# Patient Record
Sex: FEMALE | Race: WHITE | NOT HISPANIC OR LATINO | Employment: OTHER | URBAN - METROPOLITAN AREA
[De-identification: names, ages, dates, MRNs, and addresses within clinical notes are randomized per-mention and may not be internally consistent; named-entity substitution may affect disease eponyms.]

---

## 2017-01-03 ENCOUNTER — APPOINTMENT (OUTPATIENT)
Dept: PHYSICAL THERAPY | Facility: CLINIC | Age: 77
End: 2017-01-03
Payer: MEDICARE

## 2017-01-03 PROCEDURE — 97112 NEUROMUSCULAR REEDUCATION: CPT

## 2017-01-03 PROCEDURE — G8978 MOBILITY CURRENT STATUS: HCPCS

## 2017-01-03 PROCEDURE — 97140 MANUAL THERAPY 1/> REGIONS: CPT

## 2017-01-03 PROCEDURE — 97110 THERAPEUTIC EXERCISES: CPT

## 2017-01-03 PROCEDURE — G8979 MOBILITY GOAL STATUS: HCPCS

## 2017-01-06 ENCOUNTER — APPOINTMENT (OUTPATIENT)
Dept: PHYSICAL THERAPY | Facility: CLINIC | Age: 77
End: 2017-01-06
Payer: MEDICARE

## 2017-01-06 PROCEDURE — 97110 THERAPEUTIC EXERCISES: CPT

## 2017-01-06 PROCEDURE — 97112 NEUROMUSCULAR REEDUCATION: CPT

## 2017-01-10 ENCOUNTER — APPOINTMENT (OUTPATIENT)
Dept: PHYSICAL THERAPY | Facility: CLINIC | Age: 77
End: 2017-01-10
Payer: MEDICARE

## 2017-01-10 PROCEDURE — 97112 NEUROMUSCULAR REEDUCATION: CPT

## 2017-01-10 PROCEDURE — 97110 THERAPEUTIC EXERCISES: CPT

## 2017-01-10 PROCEDURE — 97140 MANUAL THERAPY 1/> REGIONS: CPT

## 2017-01-13 ENCOUNTER — APPOINTMENT (OUTPATIENT)
Dept: PHYSICAL THERAPY | Facility: CLINIC | Age: 77
End: 2017-01-13
Payer: MEDICARE

## 2017-01-13 PROCEDURE — 97110 THERAPEUTIC EXERCISES: CPT

## 2017-01-13 PROCEDURE — 97014 ELECTRIC STIMULATION THERAPY: CPT

## 2017-01-13 PROCEDURE — 97140 MANUAL THERAPY 1/> REGIONS: CPT

## 2017-01-17 ENCOUNTER — APPOINTMENT (OUTPATIENT)
Dept: PHYSICAL THERAPY | Facility: CLINIC | Age: 77
End: 2017-01-17
Payer: MEDICARE

## 2017-01-17 PROCEDURE — 97140 MANUAL THERAPY 1/> REGIONS: CPT

## 2017-01-17 PROCEDURE — 97110 THERAPEUTIC EXERCISES: CPT

## 2017-01-17 PROCEDURE — 97014 ELECTRIC STIMULATION THERAPY: CPT

## 2017-01-20 ENCOUNTER — APPOINTMENT (OUTPATIENT)
Dept: PHYSICAL THERAPY | Facility: CLINIC | Age: 77
End: 2017-01-20
Payer: MEDICARE

## 2017-01-24 ENCOUNTER — APPOINTMENT (OUTPATIENT)
Dept: PHYSICAL THERAPY | Facility: CLINIC | Age: 77
End: 2017-01-24
Payer: MEDICARE

## 2017-01-24 PROCEDURE — 97140 MANUAL THERAPY 1/> REGIONS: CPT

## 2017-01-24 PROCEDURE — 97014 ELECTRIC STIMULATION THERAPY: CPT

## 2017-01-24 PROCEDURE — 97110 THERAPEUTIC EXERCISES: CPT

## 2017-01-27 ENCOUNTER — APPOINTMENT (OUTPATIENT)
Dept: PHYSICAL THERAPY | Facility: CLINIC | Age: 77
End: 2017-01-27
Payer: MEDICARE

## 2017-01-27 PROCEDURE — 97140 MANUAL THERAPY 1/> REGIONS: CPT

## 2017-01-27 PROCEDURE — 97110 THERAPEUTIC EXERCISES: CPT

## 2017-01-27 PROCEDURE — 97014 ELECTRIC STIMULATION THERAPY: CPT

## 2017-01-31 ENCOUNTER — APPOINTMENT (OUTPATIENT)
Dept: PHYSICAL THERAPY | Facility: CLINIC | Age: 77
End: 2017-01-31
Payer: MEDICARE

## 2017-01-31 PROCEDURE — 97140 MANUAL THERAPY 1/> REGIONS: CPT

## 2017-01-31 PROCEDURE — 97110 THERAPEUTIC EXERCISES: CPT

## 2017-02-02 DIAGNOSIS — M25.551 PAIN IN RIGHT HIP: ICD-10-CM

## 2017-02-02 DIAGNOSIS — M54.50 LOW BACK PAIN: ICD-10-CM

## 2017-02-03 ENCOUNTER — APPOINTMENT (OUTPATIENT)
Dept: PHYSICAL THERAPY | Facility: CLINIC | Age: 77
End: 2017-02-03
Payer: MEDICARE

## 2017-02-03 PROCEDURE — 97140 MANUAL THERAPY 1/> REGIONS: CPT

## 2017-02-03 PROCEDURE — 97110 THERAPEUTIC EXERCISES: CPT

## 2017-02-07 ENCOUNTER — APPOINTMENT (OUTPATIENT)
Dept: PHYSICAL THERAPY | Facility: CLINIC | Age: 77
End: 2017-02-07
Payer: MEDICARE

## 2017-02-07 PROCEDURE — 97110 THERAPEUTIC EXERCISES: CPT

## 2017-02-07 PROCEDURE — 97014 ELECTRIC STIMULATION THERAPY: CPT

## 2017-02-07 PROCEDURE — G8978 MOBILITY CURRENT STATUS: HCPCS

## 2017-02-07 PROCEDURE — G8979 MOBILITY GOAL STATUS: HCPCS

## 2017-02-07 PROCEDURE — 97140 MANUAL THERAPY 1/> REGIONS: CPT

## 2017-02-09 ENCOUNTER — APPOINTMENT (OUTPATIENT)
Dept: PHYSICAL THERAPY | Facility: CLINIC | Age: 77
End: 2017-02-09
Payer: MEDICARE

## 2017-02-14 ENCOUNTER — APPOINTMENT (OUTPATIENT)
Dept: PHYSICAL THERAPY | Facility: CLINIC | Age: 77
End: 2017-02-14
Payer: MEDICARE

## 2017-02-14 PROCEDURE — 97140 MANUAL THERAPY 1/> REGIONS: CPT

## 2017-02-14 PROCEDURE — 97014 ELECTRIC STIMULATION THERAPY: CPT

## 2017-02-14 PROCEDURE — 97012 MECHANICAL TRACTION THERAPY: CPT

## 2017-02-17 ENCOUNTER — APPOINTMENT (OUTPATIENT)
Dept: PHYSICAL THERAPY | Facility: CLINIC | Age: 77
End: 2017-02-17
Payer: MEDICARE

## 2017-02-21 ENCOUNTER — APPOINTMENT (OUTPATIENT)
Dept: PHYSICAL THERAPY | Facility: CLINIC | Age: 77
End: 2017-02-21
Payer: MEDICARE

## 2017-02-21 PROCEDURE — 97110 THERAPEUTIC EXERCISES: CPT

## 2017-02-21 PROCEDURE — 97140 MANUAL THERAPY 1/> REGIONS: CPT

## 2017-02-21 PROCEDURE — 97012 MECHANICAL TRACTION THERAPY: CPT

## 2017-02-24 ENCOUNTER — APPOINTMENT (OUTPATIENT)
Dept: PHYSICAL THERAPY | Facility: CLINIC | Age: 77
End: 2017-02-24
Payer: MEDICARE

## 2017-02-24 PROCEDURE — 97012 MECHANICAL TRACTION THERAPY: CPT

## 2017-02-24 PROCEDURE — 97110 THERAPEUTIC EXERCISES: CPT

## 2017-02-28 ENCOUNTER — APPOINTMENT (OUTPATIENT)
Dept: PHYSICAL THERAPY | Facility: CLINIC | Age: 77
End: 2017-02-28
Payer: MEDICARE

## 2017-03-03 ENCOUNTER — APPOINTMENT (OUTPATIENT)
Dept: PHYSICAL THERAPY | Facility: CLINIC | Age: 77
End: 2017-03-03
Payer: MEDICARE

## 2017-03-03 PROCEDURE — 97012 MECHANICAL TRACTION THERAPY: CPT

## 2017-03-03 PROCEDURE — 97110 THERAPEUTIC EXERCISES: CPT

## 2017-03-03 PROCEDURE — 97140 MANUAL THERAPY 1/> REGIONS: CPT

## 2017-03-07 ENCOUNTER — APPOINTMENT (OUTPATIENT)
Dept: PHYSICAL THERAPY | Facility: CLINIC | Age: 77
End: 2017-03-07
Payer: MEDICARE

## 2017-03-07 PROCEDURE — 97110 THERAPEUTIC EXERCISES: CPT

## 2017-03-07 PROCEDURE — 97012 MECHANICAL TRACTION THERAPY: CPT

## 2017-03-07 PROCEDURE — 97140 MANUAL THERAPY 1/> REGIONS: CPT

## 2017-03-14 ENCOUNTER — APPOINTMENT (OUTPATIENT)
Dept: PHYSICAL THERAPY | Facility: CLINIC | Age: 77
End: 2017-03-14
Payer: MEDICARE

## 2017-03-21 ENCOUNTER — APPOINTMENT (OUTPATIENT)
Dept: PHYSICAL THERAPY | Facility: CLINIC | Age: 77
End: 2017-03-21
Payer: MEDICARE

## 2017-03-21 PROCEDURE — 97012 MECHANICAL TRACTION THERAPY: CPT

## 2017-03-21 PROCEDURE — G8978 MOBILITY CURRENT STATUS: HCPCS

## 2017-03-21 PROCEDURE — G8980 MOBILITY D/C STATUS: HCPCS

## 2017-03-21 PROCEDURE — G8979 MOBILITY GOAL STATUS: HCPCS

## 2017-03-21 PROCEDURE — 97110 THERAPEUTIC EXERCISES: CPT

## 2017-03-21 PROCEDURE — 97140 MANUAL THERAPY 1/> REGIONS: CPT

## 2017-03-27 ENCOUNTER — GENERIC CONVERSION - ENCOUNTER (OUTPATIENT)
Dept: OTHER | Facility: OTHER | Age: 77
End: 2017-03-27

## 2017-04-04 ENCOUNTER — ALLSCRIPTS OFFICE VISIT (OUTPATIENT)
Dept: OTHER | Facility: OTHER | Age: 77
End: 2017-04-04

## 2017-05-30 ENCOUNTER — APPOINTMENT (OUTPATIENT)
Dept: LAB | Facility: HOSPITAL | Age: 77
End: 2017-05-30
Attending: INTERNAL MEDICINE
Payer: MEDICARE

## 2017-05-30 ENCOUNTER — TRANSCRIBE ORDERS (OUTPATIENT)
Dept: ADMINISTRATIVE | Facility: HOSPITAL | Age: 77
End: 2017-05-30

## 2017-05-30 DIAGNOSIS — I10 ESSENTIAL (PRIMARY) HYPERTENSION: ICD-10-CM

## 2017-05-30 LAB
ALBUMIN SERPL BCP-MCNC: 3.5 G/DL (ref 3.5–5)
ALP SERPL-CCNC: 64 U/L (ref 46–116)
ALT SERPL W P-5'-P-CCNC: 24 U/L (ref 12–78)
ANION GAP SERPL CALCULATED.3IONS-SCNC: 9 MMOL/L (ref 4–13)
AST SERPL W P-5'-P-CCNC: 9 U/L (ref 5–45)
BASOPHILS # BLD AUTO: 0.02 THOUSANDS/ΜL (ref 0–0.1)
BASOPHILS NFR BLD AUTO: 0 % (ref 0–1)
BILIRUB SERPL-MCNC: 0.5 MG/DL (ref 0.2–1)
BUN SERPL-MCNC: 16 MG/DL (ref 5–25)
CALCIUM SERPL-MCNC: 8.6 MG/DL (ref 8.3–10.1)
CHLORIDE SERPL-SCNC: 108 MMOL/L (ref 100–108)
CHOLEST SERPL-MCNC: 166 MG/DL (ref 50–200)
CO2 SERPL-SCNC: 28 MMOL/L (ref 21–32)
CREAT SERPL-MCNC: 0.79 MG/DL (ref 0.6–1.3)
EOSINOPHIL # BLD AUTO: 0.19 THOUSAND/ΜL (ref 0–0.61)
EOSINOPHIL NFR BLD AUTO: 3 % (ref 0–6)
ERYTHROCYTE [DISTWIDTH] IN BLOOD BY AUTOMATED COUNT: 14 % (ref 11.6–15.1)
GFR SERPL CREATININE-BSD FRML MDRD: >60 ML/MIN/1.73SQ M
GLUCOSE P FAST SERPL-MCNC: 94 MG/DL (ref 65–99)
HCT VFR BLD AUTO: 40.2 % (ref 34.8–46.1)
HDLC SERPL-MCNC: 52 MG/DL (ref 40–60)
HGB BLD-MCNC: 13 G/DL (ref 11.5–15.4)
LDLC SERPL CALC-MCNC: 71 MG/DL (ref 0–100)
LYMPHOCYTES # BLD AUTO: 1.6 THOUSANDS/ΜL (ref 0.6–4.47)
LYMPHOCYTES NFR BLD AUTO: 27 % (ref 14–44)
MCH RBC QN AUTO: 29.1 PG (ref 26.8–34.3)
MCHC RBC AUTO-ENTMCNC: 32.3 G/DL (ref 31.4–37.4)
MCV RBC AUTO: 90 FL (ref 82–98)
MONOCYTES # BLD AUTO: 0.61 THOUSAND/ΜL (ref 0.17–1.22)
MONOCYTES NFR BLD AUTO: 10 % (ref 4–12)
NEUTROPHILS # BLD AUTO: 3.42 THOUSANDS/ΜL (ref 1.85–7.62)
NEUTS SEG NFR BLD AUTO: 60 % (ref 43–75)
PLATELET # BLD AUTO: 180 THOUSANDS/UL (ref 149–390)
PMV BLD AUTO: 12.4 FL (ref 8.9–12.7)
POTASSIUM SERPL-SCNC: 3.9 MMOL/L (ref 3.5–5.3)
PROT SERPL-MCNC: 6.7 G/DL (ref 6.4–8.2)
RBC # BLD AUTO: 4.46 MILLION/UL (ref 3.81–5.12)
SODIUM SERPL-SCNC: 145 MMOL/L (ref 136–145)
TRIGL SERPL-MCNC: 216 MG/DL
TSH SERPL DL<=0.05 MIU/L-ACNC: 1.34 UIU/ML (ref 0.36–3.74)
WBC # BLD AUTO: 5.84 THOUSAND/UL (ref 4.31–10.16)

## 2017-05-30 PROCEDURE — 85025 COMPLETE CBC W/AUTO DIFF WBC: CPT

## 2017-05-30 PROCEDURE — 80061 LIPID PANEL: CPT

## 2017-05-30 PROCEDURE — 80053 COMPREHEN METABOLIC PANEL: CPT

## 2017-05-30 PROCEDURE — 84443 ASSAY THYROID STIM HORMONE: CPT

## 2017-05-30 PROCEDURE — 36415 COLL VENOUS BLD VENIPUNCTURE: CPT

## 2017-06-15 ENCOUNTER — ALLSCRIPTS OFFICE VISIT (OUTPATIENT)
Dept: OTHER | Facility: OTHER | Age: 77
End: 2017-06-15

## 2017-06-15 DIAGNOSIS — Z00.00 ENCOUNTER FOR GENERAL ADULT MEDICAL EXAMINATION WITHOUT ABNORMAL FINDINGS: ICD-10-CM

## 2017-06-16 ENCOUNTER — GENERIC CONVERSION - ENCOUNTER (OUTPATIENT)
Dept: OTHER | Facility: OTHER | Age: 77
End: 2017-06-16

## 2017-06-30 ENCOUNTER — HOSPITAL ENCOUNTER (OUTPATIENT)
Dept: BONE DENSITY | Facility: IMAGING CENTER | Age: 77
Discharge: HOME | End: 2017-06-30
Payer: MEDICARE

## 2017-06-30 ENCOUNTER — GENERIC CONVERSION - ENCOUNTER (OUTPATIENT)
Dept: OTHER | Facility: OTHER | Age: 77
End: 2017-06-30

## 2017-06-30 DIAGNOSIS — Z00.00 ENCOUNTER FOR GENERAL ADULT MEDICAL EXAMINATION WITHOUT ABNORMAL FINDINGS: ICD-10-CM

## 2017-06-30 PROCEDURE — 77080 DXA BONE DENSITY AXIAL: CPT

## 2017-08-16 ENCOUNTER — ALLSCRIPTS OFFICE VISIT (OUTPATIENT)
Dept: OTHER | Facility: OTHER | Age: 77
End: 2017-08-16

## 2017-11-20 ENCOUNTER — GENERIC CONVERSION - ENCOUNTER (OUTPATIENT)
Dept: OTHER | Facility: OTHER | Age: 77
End: 2017-11-20

## 2017-12-13 ENCOUNTER — GENERIC CONVERSION - ENCOUNTER (OUTPATIENT)
Dept: OTHER | Facility: OTHER | Age: 77
End: 2017-12-13

## 2017-12-13 ENCOUNTER — ALLSCRIPTS OFFICE VISIT (OUTPATIENT)
Dept: OTHER | Facility: OTHER | Age: 77
End: 2017-12-13

## 2018-01-11 NOTE — RESULT NOTES
Verified Results  * XR HIP 2+ VIEW RIGHT [id number] 10:10AM Karyle Query   TW Order Number: [id number]     Test Name Result Flag Reference   * XR HIP/PELV 2-3 VWS RIGHT (Report)     RIGHT HIP     INDICATION: Chronic joint pain for 6 months  COMPARISON: March 14, 2013     VIEWS: AP pelvis and 2 coned down views of the hip; 3 images     FINDINGS:     There is no acute fracture or dislocation  Significant joint space narrowing again noted within the right hip joint  This has shown interval progression when compared to prior study  Subchondral sclerosis and hypertrophic changes seen within the acetabulum  No lytic or blastic lesions are seen  Soft tissues are unremarkable         IMPRESSION:     Advanced osteoarthritis right hip       Workstation performed: [id number]     Signed by:   Suzette Whitaker MD   5/25/16

## 2018-01-12 VITALS
WEIGHT: 197 LBS | HEART RATE: 76 BPM | HEIGHT: 68 IN | DIASTOLIC BLOOD PRESSURE: 64 MMHG | SYSTOLIC BLOOD PRESSURE: 132 MMHG | TEMPERATURE: 99.3 F | BODY MASS INDEX: 29.86 KG/M2

## 2018-01-13 VITALS
HEART RATE: 80 BPM | SYSTOLIC BLOOD PRESSURE: 152 MMHG | RESPIRATION RATE: 16 BRPM | DIASTOLIC BLOOD PRESSURE: 72 MMHG | BODY MASS INDEX: 32.89 KG/M2 | WEIGHT: 217 LBS | HEIGHT: 68 IN

## 2018-01-13 NOTE — RESULT NOTES
Verified Results  * DXA BONE DENSITY SPINE HIP AND PELVIS [id number] 09:28AM Kristin Harvey Order Number: [id number]    - Patient Instructions: To schedule this appointment, please contact Central Scheduling at [phone]  Test Name Result Flag Reference   DXA BONE DENSITY SPINE HIP AND PELVIS (Report)     CENTRAL DXA SCAN     CLINICAL HISTORY:  78year old post-menopausal  female risk factors include family history of osteoporosis  Right hip replacement  TECHNIQUE: Bone densitometry was performed using a [employer] [place] C bone densitometer  Regions of interest appear properly placed  There are no obvious fractures or other confounding variables which could limit the study  COMPARISON: None available at this institution for comparison  If and when the outside prior examinations arrive, comparison will be made and an addendum will be dictated at that time  RESULTS:    LUMBAR SPINE: L1-L4:   BMD 1 281 gm/cm2   T-score 2 1   Z-score 4 5     LEFT TOTAL HIP:   BMD 0 952 gm/cm2   T-score 0 1   Z-score 1 9     LEFT FEMORAL NECK:   BMD 0 801 gm/cm2   T-score -0 4   Z-score 1 7             IMPRESSION:   1  Based on the [facility] classification, this study is normal and the patient is considered at low risk for fracture  2  A daily intake of calcium of at least 1200 mg and vitamin D, 800-1000 IU, as well as weight bearing and muscle strengthening exercise, fall prevention and avoidance of tobacco and excessive alcohol intake as basic preventive measures are recommended  3  Repeat DXA scan on the same equipment in 18-24 months as clinically indicated  The 10 year risk of hip fracture is 4%, with the 10 year risk of major osteoporotic fracture being 12%, as calculated by the [facility] fracture risk assessment tool (FRAX)  The current NOF guidelines recommend treating patients with FRAX 10 year risk score of   >3% for hip fracture and >20% for major osteoporotic fracture        WHO CLASSIFICATION:   Normal (a T-score of -1 0 or higher)   Low bone mineral density (a T-score of less than -1 0 but higher than -2 5)   Osteoporosis (a T-score of -2 5 or less)   Severe osteoporosis (a T-score of -2 5 or less with a fragility fracture)             Workstation performed: [id number]     Signed by:   Judy Maki DO   6/30/17
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
## 2018-01-15 VITALS
SYSTOLIC BLOOD PRESSURE: 136 MMHG | HEART RATE: 80 BPM | RESPIRATION RATE: 16 BRPM | BODY MASS INDEX: 32.43 KG/M2 | WEIGHT: 214 LBS | TEMPERATURE: 99.1 F | DIASTOLIC BLOOD PRESSURE: 70 MMHG | HEIGHT: 68 IN

## 2018-01-24 VITALS
BODY MASS INDEX: 33.49 KG/M2 | OXYGEN SATURATION: 96 % | HEIGHT: 68 IN | TEMPERATURE: 96.9 F | SYSTOLIC BLOOD PRESSURE: 140 MMHG | DIASTOLIC BLOOD PRESSURE: 80 MMHG | WEIGHT: 221 LBS | HEART RATE: 75 BPM

## 2018-04-03 DIAGNOSIS — I10 ESSENTIAL HYPERTENSION: Primary | ICD-10-CM

## 2018-04-03 RX ORDER — VALSARTAN 160 MG/1
TABLET ORAL
Qty: 90 TABLET | Refills: 3 | Status: SHIPPED | OUTPATIENT
Start: 2018-04-03 | End: 2019-04-15 | Stop reason: SDUPTHER

## 2018-04-04 DIAGNOSIS — E78.00 HYPERCHOLESTEREMIA: Primary | ICD-10-CM

## 2018-04-04 RX ORDER — SIMVASTATIN 20 MG
TABLET ORAL
Qty: 90 TABLET | Refills: 0 | Status: SHIPPED | OUTPATIENT
Start: 2018-04-04 | End: 2019-02-25 | Stop reason: SDUPTHER

## 2018-04-18 ENCOUNTER — APPOINTMENT (OUTPATIENT)
Dept: LAB | Facility: HOSPITAL | Age: 78
End: 2018-04-18
Attending: INTERNAL MEDICINE
Payer: MEDICARE

## 2018-04-18 ENCOUNTER — OFFICE VISIT (OUTPATIENT)
Dept: FAMILY MEDICINE CLINIC | Facility: HOSPITAL | Age: 78
End: 2018-04-18
Payer: MEDICARE

## 2018-04-18 VITALS
OXYGEN SATURATION: 94 % | HEART RATE: 84 BPM | SYSTOLIC BLOOD PRESSURE: 140 MMHG | TEMPERATURE: 98.6 F | BODY MASS INDEX: 33.12 KG/M2 | DIASTOLIC BLOOD PRESSURE: 72 MMHG | WEIGHT: 218.5 LBS | HEIGHT: 68 IN

## 2018-04-18 DIAGNOSIS — I10 BENIGN ESSENTIAL HYPERTENSION: Primary | ICD-10-CM

## 2018-04-18 DIAGNOSIS — Z23 NEED FOR PNEUMOCOCCAL VACCINATION: ICD-10-CM

## 2018-04-18 DIAGNOSIS — I10 BENIGN ESSENTIAL HYPERTENSION: ICD-10-CM

## 2018-04-18 DIAGNOSIS — R50.9 FEVER, UNSPECIFIED FEVER CAUSE: ICD-10-CM

## 2018-04-18 DIAGNOSIS — E78.00 PURE HYPERCHOLESTEROLEMIA: ICD-10-CM

## 2018-04-18 PROBLEM — E78.5 HYPERLIPIDEMIA: Status: ACTIVE | Noted: 2018-04-18

## 2018-04-18 PROBLEM — H35.30 MACULAR DEGENERATION: Status: ACTIVE | Noted: 2018-04-18

## 2018-04-18 LAB
ALBUMIN SERPL BCP-MCNC: 3.6 G/DL (ref 3.5–5)
ALP SERPL-CCNC: 53 U/L (ref 46–116)
ALT SERPL W P-5'-P-CCNC: 38 U/L (ref 12–78)
ANION GAP SERPL CALCULATED.3IONS-SCNC: 5 MMOL/L (ref 4–13)
AST SERPL W P-5'-P-CCNC: 22 U/L (ref 5–45)
BASOPHILS # BLD AUTO: 0.01 THOUSANDS/ΜL (ref 0–0.1)
BASOPHILS NFR BLD AUTO: 0 % (ref 0–1)
BILIRUB SERPL-MCNC: 0.5 MG/DL (ref 0.2–1)
BUN SERPL-MCNC: 22 MG/DL (ref 5–25)
CALCIUM SERPL-MCNC: 8.6 MG/DL (ref 8.3–10.1)
CHLORIDE SERPL-SCNC: 107 MMOL/L (ref 100–108)
CO2 SERPL-SCNC: 33 MMOL/L (ref 21–32)
CREAT SERPL-MCNC: 0.82 MG/DL (ref 0.6–1.3)
EOSINOPHIL # BLD AUTO: 0.08 THOUSAND/ΜL (ref 0–0.61)
EOSINOPHIL NFR BLD AUTO: 1 % (ref 0–6)
ERYTHROCYTE [DISTWIDTH] IN BLOOD BY AUTOMATED COUNT: 13.4 % (ref 11.6–15.1)
FSH SERPL-ACNC: 44.5 MIU/ML
GFR SERPL CREATININE-BSD FRML MDRD: 70 ML/MIN/1.73SQ M
GLUCOSE SERPL-MCNC: 102 MG/DL (ref 65–140)
HCT VFR BLD AUTO: 40.7 % (ref 34.8–46.1)
HGB BLD-MCNC: 13.3 G/DL (ref 11.5–15.4)
LH SERPL-ACNC: 18.3 MIU/ML
LYMPHOCYTES # BLD AUTO: 1.52 THOUSANDS/ΜL (ref 0.6–4.47)
LYMPHOCYTES NFR BLD AUTO: 24 % (ref 14–44)
MCH RBC QN AUTO: 29.8 PG (ref 26.8–34.3)
MCHC RBC AUTO-ENTMCNC: 32.7 G/DL (ref 31.4–37.4)
MCV RBC AUTO: 91 FL (ref 82–98)
MONOCYTES # BLD AUTO: 0.43 THOUSAND/ΜL (ref 0.17–1.22)
MONOCYTES NFR BLD AUTO: 7 % (ref 4–12)
NEUTROPHILS # BLD AUTO: 4.31 THOUSANDS/ΜL (ref 1.85–7.62)
NEUTS SEG NFR BLD AUTO: 68 % (ref 43–75)
PLATELET # BLD AUTO: 174 THOUSANDS/UL (ref 149–390)
PMV BLD AUTO: 12.3 FL (ref 8.9–12.7)
POTASSIUM SERPL-SCNC: 4 MMOL/L (ref 3.5–5.3)
PROT SERPL-MCNC: 6.8 G/DL (ref 6.4–8.2)
RBC # BLD AUTO: 4.46 MILLION/UL (ref 3.81–5.12)
SODIUM SERPL-SCNC: 145 MMOL/L (ref 136–145)
TSH SERPL DL<=0.05 MIU/L-ACNC: 0.85 UIU/ML (ref 0.36–3.74)
WBC # BLD AUTO: 6.35 THOUSAND/UL (ref 4.31–10.16)

## 2018-04-18 PROCEDURE — 85025 COMPLETE CBC W/AUTO DIFF WBC: CPT

## 2018-04-18 PROCEDURE — 36415 COLL VENOUS BLD VENIPUNCTURE: CPT

## 2018-04-18 PROCEDURE — G0009 ADMIN PNEUMOCOCCAL VACCINE: HCPCS

## 2018-04-18 PROCEDURE — 80053 COMPREHEN METABOLIC PANEL: CPT

## 2018-04-18 PROCEDURE — 90670 PCV13 VACCINE IM: CPT

## 2018-04-18 PROCEDURE — 99214 OFFICE O/P EST MOD 30 MIN: CPT | Performed by: INTERNAL MEDICINE

## 2018-04-18 PROCEDURE — 83001 ASSAY OF GONADOTROPIN (FSH): CPT

## 2018-04-18 PROCEDURE — 84443 ASSAY THYROID STIM HORMONE: CPT

## 2018-04-18 PROCEDURE — 83002 ASSAY OF GONADOTROPIN (LH): CPT

## 2018-04-18 RX ORDER — VIT A/VIT C/VIT E/ZINC/COPPER 4296-226
2 CAPSULE ORAL DAILY
COMMUNITY
Start: 2015-05-12

## 2018-04-18 RX ORDER — FLUTICASONE PROPIONATE 50 MCG
SPRAY, SUSPENSION (ML) NASAL
COMMUNITY
Start: 2017-04-04 | End: 2018-10-22

## 2018-04-18 NOTE — PATIENT INSTRUCTIONS
You were seen today for an acute episode of illness and feeling unwell  Be sure to rest, drink fluids and take any medications ordered  If the doctor ordered testing, please get this done promptly and be sure to follow up with the doctor's office  You will get a call if any of the tests show abnormal results

## 2018-04-18 NOTE — PROGRESS NOTES
Assessment/Plan:    Benign essential hypertension  On meds currently    Hyperlipidemia  On statin    Fever  C/o off and on fever    Patient Active Problem List   Diagnosis    Benign essential hypertension    Hyperlipidemia    Macular degeneration    Fever     No orders of the defined types were placed in this encounter  Diagnoses and all orders for this visit:    Benign essential hypertension  -     Comprehensive metabolic panel; Future    Pure hypercholesterolemia    Fever, unspecified fever cause  -     CBC and differential; Future  -     TSH, 3rd generation; Future  -     FSH and LH; Future    Other orders  -     fluticasone (FLONASE) 50 mcg/act nasal spray; into each nostril  -     Discontinue: MULTIPLE VITAMINS-MINERALS ER PO; Take 2 tablets by mouth  -     Multiple Vitamins-Minerals (PRESERVISION AREDS) CAPS; Take 2 capsules by mouth daily          Subjective:      Patient ID: Nj Mckay is a 70 y o  female  Acute visit with c/o fevers        The following portions of the patient's history were reviewed and updated as appropriate: allergies, current medications, past family history, past medical history, past social history, past surgical history and problem list     Review of Systems   Constitutional: Negative for fatigue and fever  HENT: Negative for hearing loss  Eyes: Negative for visual disturbance  Respiratory: Negative for cough, chest tightness, shortness of breath and wheezing  Cardiovascular: Negative for chest pain, palpitations and leg swelling  Gastrointestinal: Negative for abdominal pain, diarrhea and nausea  Genitourinary: Negative for dysuria and hematuria  Musculoskeletal: Negative for arthralgias  Neurological: Negative for dizziness, numbness and headaches  Psychiatric/Behavioral: Negative for confusion and dysphoric mood  All other systems reviewed and are negative          Objective:      Current Outpatient Prescriptions:     acetaminophen (TYLENOL) 325 mg tablet, Take 650 mg by mouth every 6 (six) hours as needed for mild pain, Disp: , Rfl:     amLODIPine (NORVASC) 5 mg tablet, Take 5 mg by mouth daily, Disp: , Rfl:     aspirin 325 mg tablet, Take 325 mg by mouth daily, Disp: , Rfl:     calcium carbonate-vitamin D (OSCAL-D) 500 mg-200 units per tablet, Take 2 tablets by mouth daily with breakfast, Disp: , Rfl:     celecoxib (CeleBREX) 200 mg capsule, Take 200 mg by mouth 2 (two) times a day, Disp: , Rfl:     docusate sodium (COLACE) 100 mg capsule, Take 100 mg by mouth 2 (two) times a day, Disp: , Rfl:     famotidine (PEPCID) 20 mg tablet, Take 20 mg by mouth 2 (two) times a day, Disp: , Rfl:     fexofenadine (ALLEGRA) 180 MG tablet, Take 180 mg by mouth daily, Disp: , Rfl:     gabapentin (NEURONTIN) 100 mg capsule, Take 100 mg by mouth 3 (three) times a day, Disp: , Rfl:     simvastatin (ZOCOR) 20 mg tablet, TAKE 1 TABLET BY MOUTH EVERY DAY, Disp: 90 tablet, Rfl: 0    sodium chloride (DEEJAY 128) 2 % hypertonic ophthalmic solution, 1 drop 5 (five) times a day, Disp: , Rfl:     valsartan (DIOVAN) 160 mg tablet, TAKE 1 TABLET EVERY DAY, Disp: 90 tablet, Rfl: 3     Physical Exam   Constitutional: She is oriented to person, place, and time  She appears well-developed and well-nourished  Eyes: Pupils are equal, round, and reactive to light  Neck: Normal range of motion  Neck supple  No thyromegaly present  Cardiovascular: Normal rate, regular rhythm, normal heart sounds and intact distal pulses  No murmur heard  Pulmonary/Chest: Effort normal and breath sounds normal  She has no wheezes  She has no rales  Abdominal: Soft  Bowel sounds are normal  There is no tenderness  Musculoskeletal: Normal range of motion  She exhibits no edema, tenderness or deformity  Lymphadenopathy:     She has no cervical adenopathy  Neurological: She is alert and oriented to person, place, and time  She has normal reflexes     Skin: Skin is warm and dry    Psychiatric: She has a normal mood and affect  Vitals reviewed

## 2018-04-18 NOTE — ASSESSMENT & PLAN NOTE
C/o off and on fever    Describes hot flushes intermittently,  Can last 30 min to few hours,  Suspect more hot flashes that fever  No documentation of fever

## 2018-04-29 DIAGNOSIS — I10 ESSENTIAL HYPERTENSION: Primary | ICD-10-CM

## 2018-04-30 RX ORDER — AMLODIPINE BESYLATE 5 MG/1
TABLET ORAL
Qty: 90 TABLET | Refills: 2 | Status: SHIPPED | OUTPATIENT
Start: 2018-04-30 | End: 2019-02-06 | Stop reason: SDUPTHER

## 2018-05-14 DIAGNOSIS — E78.5 HYPERLIPIDEMIA: ICD-10-CM

## 2018-05-14 DIAGNOSIS — Z00.00 ENCOUNTER FOR GENERAL ADULT MEDICAL EXAMINATION WITHOUT ABNORMAL FINDINGS: ICD-10-CM

## 2018-10-22 ENCOUNTER — OFFICE VISIT (OUTPATIENT)
Dept: FAMILY MEDICINE CLINIC | Facility: HOSPITAL | Age: 78
End: 2018-10-22
Payer: MEDICARE

## 2018-10-22 VITALS
HEIGHT: 68 IN | WEIGHT: 231 LBS | BODY MASS INDEX: 35.01 KG/M2 | SYSTOLIC BLOOD PRESSURE: 146 MMHG | HEART RATE: 86 BPM | DIASTOLIC BLOOD PRESSURE: 76 MMHG

## 2018-10-22 DIAGNOSIS — E78.49 OTHER HYPERLIPIDEMIA: ICD-10-CM

## 2018-10-22 DIAGNOSIS — E78.00 PURE HYPERCHOLESTEROLEMIA: Primary | ICD-10-CM

## 2018-10-22 DIAGNOSIS — I10 BENIGN ESSENTIAL HYPERTENSION: ICD-10-CM

## 2018-10-22 DIAGNOSIS — R53.83 FATIGUE, UNSPECIFIED TYPE: ICD-10-CM

## 2018-10-22 PROBLEM — R50.9 FEVER: Status: RESOLVED | Noted: 2018-04-18 | Resolved: 2018-10-22

## 2018-10-22 PROCEDURE — 99214 OFFICE O/P EST MOD 30 MIN: CPT | Performed by: INTERNAL MEDICINE

## 2018-10-22 NOTE — PATIENT INSTRUCTIONS
Please check to see if you are taking amlodipine 5 mg daily for blood pressure? Call us with answer  Labs slip given today for labs due in spring  Fast 12 hours and get these done one week before next visit  You were seen for a follow up of chronic medical problems today  Be sure to make any changes to your medication as discussed by your doctor  If blood tests or other testing was ordered, be sure to obtain this at the time requested by the doctor  Our office will call you with the results of your tests once they arrive in our office

## 2018-10-22 NOTE — PROGRESS NOTES
Subjective:   Chief Complaint   Patient presents with    Follow-up     pt declines flu vaccine  Patient ID: Alfredo Mar is a 70 y o  female  Here for follow up  Doing well  Confusion about meds and did discuss this  The following portions of the patient's history were reviewed and updated as appropriate: allergies, current medications, past family history, past medical history, past social history, past surgical history and problem list     Review of Systems   Constitutional: Negative for fatigue and fever  HENT: Negative for hearing loss  Eyes: Negative for visual disturbance  Respiratory: Negative for cough, chest tightness, shortness of breath and wheezing  Cardiovascular: Negative for chest pain, palpitations and leg swelling  Gastrointestinal: Negative for abdominal pain, diarrhea and nausea  Genitourinary: Negative for dysuria and hematuria  Musculoskeletal: Negative for arthralgias  Neurological: Negative for dizziness, numbness and headaches  Psychiatric/Behavioral: Negative for confusion and dysphoric mood  All other systems reviewed and are negative  Current Outpatient Prescriptions on File Prior to Visit   Medication Sig Dispense Refill    amLODIPine (NORVASC) 5 mg tablet TAKE 1 TABLET DAILY   90 tablet 2    calcium carbonate-vitamin D (OSCAL-D) 500 mg-200 units per tablet Take 2 tablets by mouth daily with breakfast      Multiple Vitamins-Minerals (PRESERVISION AREDS) CAPS Take 2 capsules by mouth daily      simvastatin (ZOCOR) 20 mg tablet TAKE 1 TABLET BY MOUTH EVERY DAY 90 tablet 0    sodium chloride (DEEJAY 128) 2 % hypertonic ophthalmic solution 1 drop 5 (five) times a day      valsartan (DIOVAN) 160 mg tablet TAKE 1 TABLET EVERY DAY 90 tablet 3    [DISCONTINUED] aspirin 325 mg tablet Take 325 mg by mouth daily      [DISCONTINUED] celecoxib (CeleBREX) 200 mg capsule Take 200 mg by mouth 2 (two) times a day      [DISCONTINUED] fluticasone (FLONASE) 50 mcg/act nasal spray into each nostril      [DISCONTINUED] acetaminophen (TYLENOL) 325 mg tablet Take 650 mg by mouth every 6 (six) hours as needed for mild pain      [DISCONTINUED] docusate sodium (COLACE) 100 mg capsule Take 100 mg by mouth 2 (two) times a day      [DISCONTINUED] famotidine (PEPCID) 20 mg tablet Take 20 mg by mouth 2 (two) times a day      [DISCONTINUED] fexofenadine (ALLEGRA) 180 MG tablet Take 180 mg by mouth daily      [DISCONTINUED] gabapentin (NEURONTIN) 100 mg capsule Take 100 mg by mouth 3 (three) times a day       No current facility-administered medications on file prior to visit  Objective:  Vitals:    10/22/18 0850   BP: 146/76   Pulse: 86   Weight: 105 kg (231 lb)   Height: 5' 7 5" (1 715 m)      Physical Exam   Constitutional: She is oriented to person, place, and time  She appears well-developed and well-nourished  Eyes: Pupils are equal, round, and reactive to light  Neck: Normal range of motion  Neck supple  No thyromegaly present  Cardiovascular: Normal rate, regular rhythm, normal heart sounds and intact distal pulses  No murmur heard  Pulmonary/Chest: Effort normal and breath sounds normal  She has no wheezes  She has no rales  Abdominal: Soft  Bowel sounds are normal  There is no tenderness  Musculoskeletal: Normal range of motion  She exhibits no edema, tenderness or deformity  Lymphadenopathy:     She has no cervical adenopathy  Neurological: She is alert and oriented to person, place, and time  She has normal reflexes  Skin: Skin is warm and dry  Psychiatric: She has a normal mood and affect  Nursing note and vitals reviewed  Assessment/Plan:    No problem-specific Assessment & Plan notes found for this encounter         Diagnoses and all orders for this visit:    Pure hypercholesterolemia    Benign essential hypertension    Other hyperlipidemia    Other orders  -     Cancel: influenza vaccine, [id number], high-dose, PF 0 5 mL, for patients 65 yr+ (FLUZONE HIGH-DOSE)

## 2019-02-06 ENCOUNTER — TELEPHONE (OUTPATIENT)
Dept: FAMILY MEDICINE CLINIC | Facility: HOSPITAL | Age: 79
End: 2019-02-06

## 2019-02-06 DIAGNOSIS — I10 ESSENTIAL HYPERTENSION: ICD-10-CM

## 2019-02-06 RX ORDER — AMLODIPINE BESYLATE 5 MG/1
5 TABLET ORAL DAILY
Qty: 90 TABLET | Refills: 2 | Status: SHIPPED | OUTPATIENT
Start: 2019-02-06 | End: 2020-12-29 | Stop reason: SDUPTHER

## 2019-02-12 ENCOUNTER — OFFICE VISIT (OUTPATIENT)
Dept: FAMILY MEDICINE CLINIC | Facility: HOSPITAL | Age: 79
End: 2019-02-12
Payer: MEDICARE

## 2019-02-12 VITALS
WEIGHT: 228.5 LBS | OXYGEN SATURATION: 97 % | TEMPERATURE: 98.1 F | DIASTOLIC BLOOD PRESSURE: 74 MMHG | HEART RATE: 84 BPM | BODY MASS INDEX: 34.63 KG/M2 | HEIGHT: 68 IN | SYSTOLIC BLOOD PRESSURE: 180 MMHG

## 2019-02-12 DIAGNOSIS — R03.0 ELEVATED BLOOD PRESSURE READING: Primary | ICD-10-CM

## 2019-02-12 DIAGNOSIS — R41.3 MEMORY LOSS: ICD-10-CM

## 2019-02-12 PROCEDURE — 99214 OFFICE O/P EST MOD 30 MIN: CPT | Performed by: INTERNAL MEDICINE

## 2019-02-12 NOTE — PATIENT INSTRUCTIONS
Please obtain labs ordered as soon as possible         Consider adding medication for stress/depression

## 2019-02-12 NOTE — PROGRESS NOTES
Subjective:   Chief Complaint   Patient presents with    Memory Loss     x worse 2 month         Patient ID: Frandy Moreno is a 70 y o  female  Here with    is concerned about patient's forgetfulness  Patient appears upset and agitated as  is talking  Patient states no family hx of dementia  Patient states she is not as concerned about this as    has had recent concerns with worsening  The following portions of the patient's history were reviewed and updated as appropriate: allergies, current medications, past family history, past medical history, past social history, past surgical history and problem list     Review of Systems   Constitutional: Negative for fatigue and fever  HENT: Negative for hearing loss  Eyes: Negative for visual disturbance  Respiratory: Negative for cough, chest tightness, shortness of breath and wheezing  Cardiovascular: Negative for chest pain, palpitations and leg swelling  Gastrointestinal: Negative for abdominal pain, diarrhea and nausea  Genitourinary: Negative for dysuria and hematuria  Musculoskeletal: Negative for arthralgias  Neurological: Negative for dizziness, numbness and headaches  Psychiatric/Behavioral: Positive for confusion  Negative for dysphoric mood  All other systems reviewed and are negative          Current Outpatient Medications on File Prior to Visit   Medication Sig Dispense Refill    amLODIPine (NORVASC) 5 mg tablet Take 1 tablet (5 mg total) by mouth daily 90 tablet 2    calcium carbonate-vitamin D (OSCAL-D) 500 mg-200 units per tablet Take 2 tablets by mouth daily with breakfast      Multiple Vitamins-Minerals (PRESERVISION AREDS) CAPS Take 2 capsules by mouth daily      simvastatin (ZOCOR) 20 mg tablet TAKE 1 TABLET BY MOUTH EVERY DAY 90 tablet 0    sodium chloride (DEEJAY 128) 2 % hypertonic ophthalmic solution 1 drop 5 (five) times a day      valsartan (DIOVAN) 160 mg tablet TAKE 1 TABLET EVERY DAY 90 tablet 3     No current facility-administered medications on file prior to visit  Objective:  Vitals:    02/12/19 1108   BP: (!) 180/74   Pulse: 84   Temp: 98 1 °F (36 7 °C)   SpO2: 97%   Weight: 104 kg (228 lb 8 oz)   Height: 5' 7 5" (1 715 m)      Physical Exam   Constitutional: She is oriented to person, place, and time  She appears well-developed and well-nourished  Eyes: Pupils are equal, round, and reactive to light  Neck: Normal range of motion  Neck supple  No thyromegaly present  Cardiovascular: Normal rate, regular rhythm, normal heart sounds and intact distal pulses  No murmur heard  Pulmonary/Chest: Effort normal and breath sounds normal  She has no wheezes  She has no rales  Abdominal: Soft  Bowel sounds are normal  There is no tenderness  Musculoskeletal: Normal range of motion  She exhibits no edema, tenderness or deformity  Lymphadenopathy:     She has no cervical adenopathy  Neurological: She is alert and oriented to person, place, and time  She has normal reflexes  Skin: Skin is warm and dry  Psychiatric: She has a normal mood and affect  Nursing note and vitals reviewed  Assessment/Plan:    No problem-specific Assessment & Plan notes found for this encounter  Diagnoses and all orders for this visit:    Elevated blood pressure reading    Memory loss  -     Vitamin D 25 hydroxy; Future  -     Vitamin B1, whole blood; Future    Body mass index (BMI) of 35 0-35 9 in adult   -     Vitamin D 25 hydroxy;  Future

## 2019-02-18 ENCOUNTER — APPOINTMENT (OUTPATIENT)
Dept: LAB | Facility: HOSPITAL | Age: 79
End: 2019-02-18
Attending: INTERNAL MEDICINE
Payer: MEDICARE

## 2019-02-18 DIAGNOSIS — R41.3 MEMORY LOSS: ICD-10-CM

## 2019-02-18 DIAGNOSIS — E78.49 OTHER HYPERLIPIDEMIA: ICD-10-CM

## 2019-02-18 DIAGNOSIS — I10 BENIGN ESSENTIAL HYPERTENSION: ICD-10-CM

## 2019-02-18 DIAGNOSIS — R53.83 FATIGUE, UNSPECIFIED TYPE: ICD-10-CM

## 2019-02-18 LAB
25(OH)D3 SERPL-MCNC: 40.6 NG/ML (ref 30–100)
ALBUMIN SERPL BCP-MCNC: 3.7 G/DL (ref 3.5–5)
ALP SERPL-CCNC: 64 U/L (ref 46–116)
ALT SERPL W P-5'-P-CCNC: 29 U/L (ref 12–78)
ANION GAP SERPL CALCULATED.3IONS-SCNC: 9 MMOL/L (ref 4–13)
AST SERPL W P-5'-P-CCNC: 21 U/L (ref 5–45)
BASOPHILS # BLD AUTO: 0.02 THOUSANDS/ΜL (ref 0–0.1)
BASOPHILS NFR BLD AUTO: 0 % (ref 0–1)
BILIRUB SERPL-MCNC: 0.6 MG/DL (ref 0.2–1)
BUN SERPL-MCNC: 16 MG/DL (ref 5–25)
CALCIUM SERPL-MCNC: 8.5 MG/DL (ref 8.3–10.1)
CHLORIDE SERPL-SCNC: 106 MMOL/L (ref 100–108)
CHOLEST SERPL-MCNC: 152 MG/DL (ref 50–200)
CO2 SERPL-SCNC: 30 MMOL/L (ref 21–32)
CREAT SERPL-MCNC: 0.9 MG/DL (ref 0.6–1.3)
EOSINOPHIL # BLD AUTO: 0.14 THOUSAND/ΜL (ref 0–0.61)
EOSINOPHIL NFR BLD AUTO: 3 % (ref 0–6)
ERYTHROCYTE [DISTWIDTH] IN BLOOD BY AUTOMATED COUNT: 12.3 % (ref 11.6–15.1)
GFR SERPL CREATININE-BSD FRML MDRD: 62 ML/MIN/1.73SQ M
GLUCOSE P FAST SERPL-MCNC: 116 MG/DL (ref 65–99)
HCT VFR BLD AUTO: 43.2 % (ref 34.8–46.1)
HDLC SERPL-MCNC: 47 MG/DL (ref 40–60)
HGB BLD-MCNC: 14.6 G/DL (ref 11.5–15.4)
IMM GRANULOCYTES # BLD AUTO: 0.02 THOUSAND/UL (ref 0–0.2)
IMM GRANULOCYTES NFR BLD AUTO: 0 % (ref 0–2)
LDLC SERPL CALC-MCNC: 71 MG/DL (ref 0–100)
LYMPHOCYTES # BLD AUTO: 1.4 THOUSANDS/ΜL (ref 0.6–4.47)
LYMPHOCYTES NFR BLD AUTO: 25 % (ref 14–44)
MCH RBC QN AUTO: 30.8 PG (ref 26.8–34.3)
MCHC RBC AUTO-ENTMCNC: 33.8 G/DL (ref 31.4–37.4)
MCV RBC AUTO: 91 FL (ref 82–98)
MONOCYTES # BLD AUTO: 0.44 THOUSAND/ΜL (ref 0.17–1.22)
MONOCYTES NFR BLD AUTO: 8 % (ref 4–12)
NEUTROPHILS # BLD AUTO: 3.57 THOUSANDS/ΜL (ref 1.85–7.62)
NEUTS SEG NFR BLD AUTO: 64 % (ref 43–75)
NRBC BLD AUTO-RTO: 0 /100 WBCS
PLATELET # BLD AUTO: 172 THOUSANDS/UL (ref 149–390)
PMV BLD AUTO: 12 FL (ref 8.9–12.7)
POTASSIUM SERPL-SCNC: 3.9 MMOL/L (ref 3.5–5.3)
PROT SERPL-MCNC: 6.9 G/DL (ref 6.4–8.2)
RBC # BLD AUTO: 4.74 MILLION/UL (ref 3.81–5.12)
SODIUM SERPL-SCNC: 145 MMOL/L (ref 136–145)
TRIGL SERPL-MCNC: 169 MG/DL
TSH SERPL DL<=0.05 MIU/L-ACNC: 0.85 UIU/ML (ref 0.36–3.74)
WBC # BLD AUTO: 5.59 THOUSAND/UL (ref 4.31–10.16)

## 2019-02-18 PROCEDURE — 36415 COLL VENOUS BLD VENIPUNCTURE: CPT

## 2019-02-18 PROCEDURE — 84425 ASSAY OF VITAMIN B-1: CPT

## 2019-02-18 PROCEDURE — 82306 VITAMIN D 25 HYDROXY: CPT

## 2019-02-18 PROCEDURE — 80053 COMPREHEN METABOLIC PANEL: CPT

## 2019-02-18 PROCEDURE — 84443 ASSAY THYROID STIM HORMONE: CPT

## 2019-02-18 PROCEDURE — 85025 COMPLETE CBC W/AUTO DIFF WBC: CPT

## 2019-02-18 PROCEDURE — 80061 LIPID PANEL: CPT

## 2019-02-21 LAB — VIT B1 BLD-SCNC: 111 NMOL/L (ref 66.5–200)

## 2019-02-25 DIAGNOSIS — E78.00 HYPERCHOLESTEREMIA: ICD-10-CM

## 2019-02-25 RX ORDER — SIMVASTATIN 20 MG
20 TABLET ORAL DAILY
Qty: 90 TABLET | Refills: 3 | Status: SHIPPED | OUTPATIENT
Start: 2019-02-25 | End: 2020-04-21

## 2019-04-15 DIAGNOSIS — I10 ESSENTIAL HYPERTENSION: ICD-10-CM

## 2019-04-16 RX ORDER — VALSARTAN 160 MG/1
TABLET ORAL
Qty: 90 TABLET | Refills: 3 | Status: SHIPPED | OUTPATIENT
Start: 2019-04-16 | End: 2020-03-30 | Stop reason: SDUPTHER

## 2019-04-24 ENCOUNTER — OFFICE VISIT (OUTPATIENT)
Dept: FAMILY MEDICINE CLINIC | Facility: HOSPITAL | Age: 79
End: 2019-04-24
Payer: MEDICARE

## 2019-04-24 VITALS
BODY MASS INDEX: 32.28 KG/M2 | WEIGHT: 213 LBS | SYSTOLIC BLOOD PRESSURE: 140 MMHG | HEIGHT: 68 IN | DIASTOLIC BLOOD PRESSURE: 84 MMHG | HEART RATE: 77 BPM | RESPIRATION RATE: 16 BRPM

## 2019-04-24 DIAGNOSIS — E66.09 CLASS 1 OBESITY DUE TO EXCESS CALORIES WITH SERIOUS COMORBIDITY AND BODY MASS INDEX (BMI) OF 32.0 TO 32.9 IN ADULT: ICD-10-CM

## 2019-04-24 DIAGNOSIS — Z00.00 HEALTH CARE MAINTENANCE: Primary | ICD-10-CM

## 2019-04-24 DIAGNOSIS — E78.00 PURE HYPERCHOLESTEROLEMIA: ICD-10-CM

## 2019-04-24 DIAGNOSIS — I10 BENIGN ESSENTIAL HYPERTENSION: ICD-10-CM

## 2019-04-24 PROBLEM — R41.3 MEMORY LOSS: Status: RESOLVED | Noted: 2019-02-12 | Resolved: 2019-04-24

## 2019-04-24 PROBLEM — H35.30 MACULAR DEGENERATION: Status: RESOLVED | Noted: 2018-04-18 | Resolved: 2019-04-24

## 2019-04-24 PROBLEM — R03.0 ELEVATED BLOOD PRESSURE READING: Status: RESOLVED | Noted: 2019-02-12 | Resolved: 2019-04-24

## 2019-04-24 PROCEDURE — 99214 OFFICE O/P EST MOD 30 MIN: CPT | Performed by: INTERNAL MEDICINE

## 2019-04-24 PROCEDURE — G0439 PPPS, SUBSEQ VISIT: HCPCS | Performed by: INTERNAL MEDICINE

## 2019-08-30 ENCOUNTER — TRANSCRIBE ORDERS (OUTPATIENT)
Dept: ADMINISTRATIVE | Facility: HOSPITAL | Age: 79
End: 2019-08-30

## 2019-08-30 ENCOUNTER — APPOINTMENT (OUTPATIENT)
Dept: LAB | Facility: HOSPITAL | Age: 79
End: 2019-08-30
Payer: MEDICARE

## 2019-08-30 DIAGNOSIS — R41.3 MEMORY LOSS: Primary | ICD-10-CM

## 2019-08-30 DIAGNOSIS — R41.3 MEMORY LOSS: ICD-10-CM

## 2019-08-30 LAB
ALBUMIN SERPL BCP-MCNC: 3.9 G/DL (ref 3.5–5)
ALP SERPL-CCNC: 59 U/L (ref 46–116)
ALT SERPL W P-5'-P-CCNC: 32 U/L (ref 12–78)
ANION GAP SERPL CALCULATED.3IONS-SCNC: 11 MMOL/L (ref 4–13)
AST SERPL W P-5'-P-CCNC: 18 U/L (ref 5–45)
BASOPHILS # BLD AUTO: 0.02 THOUSANDS/ΜL (ref 0–0.1)
BASOPHILS NFR BLD AUTO: 0 % (ref 0–1)
BILIRUB SERPL-MCNC: 0.5 MG/DL (ref 0.2–1)
BUN SERPL-MCNC: 18 MG/DL (ref 5–25)
CALCIUM SERPL-MCNC: 9.1 MG/DL (ref 8.3–10.1)
CHLORIDE SERPL-SCNC: 106 MMOL/L (ref 100–108)
CO2 SERPL-SCNC: 27 MMOL/L (ref 21–32)
CREAT SERPL-MCNC: 0.83 MG/DL (ref 0.6–1.3)
EOSINOPHIL # BLD AUTO: 0.13 THOUSAND/ΜL (ref 0–0.61)
EOSINOPHIL NFR BLD AUTO: 2 % (ref 0–6)
ERYTHROCYTE [DISTWIDTH] IN BLOOD BY AUTOMATED COUNT: 12.6 % (ref 11.6–15.1)
FOLATE SERPL-MCNC: 14.1 NG/ML (ref 3.1–17.5)
GFR SERPL CREATININE-BSD FRML MDRD: 68 ML/MIN/1.73SQ M
GLUCOSE P FAST SERPL-MCNC: 107 MG/DL (ref 65–99)
HCT VFR BLD AUTO: 42.9 % (ref 34.8–46.1)
HGB BLD-MCNC: 14.6 G/DL (ref 11.5–15.4)
IMM GRANULOCYTES # BLD AUTO: 0.01 THOUSAND/UL (ref 0–0.2)
IMM GRANULOCYTES NFR BLD AUTO: 0 % (ref 0–2)
LYMPHOCYTES # BLD AUTO: 1.42 THOUSANDS/ΜL (ref 0.6–4.47)
LYMPHOCYTES NFR BLD AUTO: 24 % (ref 14–44)
MCH RBC QN AUTO: 30.6 PG (ref 26.8–34.3)
MCHC RBC AUTO-ENTMCNC: 34 G/DL (ref 31.4–37.4)
MCV RBC AUTO: 90 FL (ref 82–98)
MONOCYTES # BLD AUTO: 0.51 THOUSAND/ΜL (ref 0.17–1.22)
MONOCYTES NFR BLD AUTO: 9 % (ref 4–12)
NEUTROPHILS # BLD AUTO: 3.94 THOUSANDS/ΜL (ref 1.85–7.62)
NEUTS SEG NFR BLD AUTO: 65 % (ref 43–75)
NRBC BLD AUTO-RTO: 0 /100 WBCS
PLATELET # BLD AUTO: 198 THOUSANDS/UL (ref 149–390)
PMV BLD AUTO: 12.1 FL (ref 8.9–12.7)
POTASSIUM SERPL-SCNC: 4.4 MMOL/L (ref 3.5–5.3)
PROT SERPL-MCNC: 7.2 G/DL (ref 6.4–8.2)
RBC # BLD AUTO: 4.77 MILLION/UL (ref 3.81–5.12)
SODIUM SERPL-SCNC: 144 MMOL/L (ref 136–145)
TSH SERPL DL<=0.05 MIU/L-ACNC: 0.83 UIU/ML (ref 0.36–3.74)
VIT B12 SERPL-MCNC: 387 PG/ML (ref 100–900)
WBC # BLD AUTO: 6.03 THOUSAND/UL (ref 4.31–10.16)

## 2019-08-30 PROCEDURE — 84443 ASSAY THYROID STIM HORMONE: CPT

## 2019-08-30 PROCEDURE — 82607 VITAMIN B-12: CPT

## 2019-08-30 PROCEDURE — 80053 COMPREHEN METABOLIC PANEL: CPT

## 2019-08-30 PROCEDURE — 36415 COLL VENOUS BLD VENIPUNCTURE: CPT

## 2019-08-30 PROCEDURE — 85025 COMPLETE CBC W/AUTO DIFF WBC: CPT

## 2019-08-30 PROCEDURE — 82746 ASSAY OF FOLIC ACID SERUM: CPT

## 2019-09-16 ENCOUNTER — HOSPITAL ENCOUNTER (OUTPATIENT)
Dept: MRI IMAGING | Facility: HOSPITAL | Age: 79
Discharge: HOME | End: 2019-09-16
Payer: MEDICARE

## 2019-09-16 DIAGNOSIS — R41.3 MEMORY LOSS: ICD-10-CM

## 2019-09-16 PROCEDURE — 70551 MRI BRAIN STEM W/O DYE: CPT

## 2019-12-30 PROBLEM — E66.811 CLASS 1 OBESITY DUE TO EXCESS CALORIES WITH SERIOUS COMORBIDITY AND BODY MASS INDEX (BMI) OF 32.0 TO 32.9 IN ADULT: Status: ACTIVE | Noted: 2019-12-30

## 2019-12-30 PROBLEM — E66.09 CLASS 1 OBESITY DUE TO EXCESS CALORIES WITH SERIOUS COMORBIDITY AND BODY MASS INDEX (BMI) OF 32.0 TO 32.9 IN ADULT: Status: ACTIVE | Noted: 2019-12-30

## 2020-03-29 DIAGNOSIS — I10 ESSENTIAL HYPERTENSION: ICD-10-CM

## 2020-03-30 RX ORDER — VALSARTAN 160 MG/1
160 TABLET ORAL DAILY
Qty: 90 TABLET | Refills: 3 | Status: SHIPPED | OUTPATIENT
Start: 2020-03-30 | End: 2021-04-21

## 2020-03-30 RX ORDER — VALSARTAN 160 MG/1
TABLET ORAL
Qty: 90 TABLET | Refills: 3 | OUTPATIENT
Start: 2020-03-30

## 2020-04-21 DIAGNOSIS — E78.00 HYPERCHOLESTEREMIA: ICD-10-CM

## 2020-04-21 RX ORDER — SIMVASTATIN 20 MG
TABLET ORAL
Qty: 90 TABLET | Refills: 3 | Status: SHIPPED | OUTPATIENT
Start: 2020-04-21 | End: 2021-04-19

## 2020-10-26 ENCOUNTER — HOSPITAL ENCOUNTER (EMERGENCY)
Facility: HOSPITAL | Age: 80
Discharge: HOME | End: 2020-10-26
Attending: EMERGENCY MEDICINE
Payer: MEDICARE

## 2020-10-26 VITALS
WEIGHT: 242.8 LBS | HEART RATE: 70 BPM | DIASTOLIC BLOOD PRESSURE: 89 MMHG | SYSTOLIC BLOOD PRESSURE: 154 MMHG | OXYGEN SATURATION: 96 % | RESPIRATION RATE: 18 BRPM | BODY MASS INDEX: 35.96 KG/M2 | HEIGHT: 69 IN | TEMPERATURE: 97.4 F

## 2020-10-26 DIAGNOSIS — N93.9 ABNORMAL VAGINAL BLEEDING: Primary | ICD-10-CM

## 2020-10-26 LAB
ANION GAP SERPL CALCULATED.3IONS-SCNC: 7 MMOL/L (ref 4–13)
APTT PPP: 28 SECONDS (ref 23–37)
BASOPHILS # BLD AUTO: 0.03 THOUSANDS/ΜL (ref 0–0.1)
BASOPHILS NFR BLD AUTO: 1 % (ref 0–1)
BUN SERPL-MCNC: 15 MG/DL (ref 5–25)
CALCIUM SERPL-MCNC: 8.5 MG/DL (ref 8.3–10.1)
CHLORIDE SERPL-SCNC: 107 MMOL/L (ref 100–108)
CO2 SERPL-SCNC: 27 MMOL/L (ref 21–32)
CREAT SERPL-MCNC: 0.85 MG/DL (ref 0.6–1.3)
EOSINOPHIL # BLD AUTO: 0.14 THOUSAND/ΜL (ref 0–0.61)
EOSINOPHIL NFR BLD AUTO: 2 % (ref 0–6)
ERYTHROCYTE [DISTWIDTH] IN BLOOD BY AUTOMATED COUNT: 12.3 % (ref 11.6–15.1)
GFR SERPL CREATININE-BSD FRML MDRD: 66 ML/MIN/1.73SQ M
GLUCOSE SERPL-MCNC: 84 MG/DL (ref 65–140)
HCT VFR BLD AUTO: 41.6 % (ref 34.8–46.1)
HGB BLD-MCNC: 14.1 G/DL (ref 11.5–15.4)
IMM GRANULOCYTES # BLD AUTO: 0.02 THOUSAND/UL (ref 0–0.2)
IMM GRANULOCYTES NFR BLD AUTO: 0 % (ref 0–2)
INR PPP: 1.01 (ref 0.84–1.19)
LYMPHOCYTES # BLD AUTO: 1.58 THOUSANDS/ΜL (ref 0.6–4.47)
LYMPHOCYTES NFR BLD AUTO: 26 % (ref 14–44)
MCH RBC QN AUTO: 30.9 PG (ref 26.8–34.3)
MCHC RBC AUTO-ENTMCNC: 33.9 G/DL (ref 31.4–37.4)
MCV RBC AUTO: 91 FL (ref 82–98)
MONOCYTES # BLD AUTO: 0.65 THOUSAND/ΜL (ref 0.17–1.22)
MONOCYTES NFR BLD AUTO: 11 % (ref 4–12)
NEUTROPHILS # BLD AUTO: 3.73 THOUSANDS/ΜL (ref 1.85–7.62)
NEUTS SEG NFR BLD AUTO: 60 % (ref 43–75)
NRBC BLD AUTO-RTO: 0 /100 WBCS
PLATELET # BLD AUTO: 172 THOUSANDS/UL (ref 149–390)
PMV BLD AUTO: 12.4 FL (ref 8.9–12.7)
POTASSIUM SERPL-SCNC: 4.2 MMOL/L (ref 3.5–5.3)
PROTHROMBIN TIME: 13.3 SECONDS (ref 11.6–14.5)
RBC # BLD AUTO: 4.57 MILLION/UL (ref 3.81–5.12)
SODIUM SERPL-SCNC: 141 MMOL/L (ref 136–145)
WBC # BLD AUTO: 6.15 THOUSAND/UL (ref 4.31–10.16)

## 2020-10-26 PROCEDURE — 85730 THROMBOPLASTIN TIME PARTIAL: CPT | Performed by: EMERGENCY MEDICINE

## 2020-10-26 PROCEDURE — 85025 COMPLETE CBC W/AUTO DIFF WBC: CPT | Performed by: EMERGENCY MEDICINE

## 2020-10-26 PROCEDURE — 85610 PROTHROMBIN TIME: CPT | Performed by: EMERGENCY MEDICINE

## 2020-10-26 PROCEDURE — 36415 COLL VENOUS BLD VENIPUNCTURE: CPT | Performed by: EMERGENCY MEDICINE

## 2020-10-26 PROCEDURE — 99284 EMERGENCY DEPT VISIT MOD MDM: CPT

## 2020-10-26 PROCEDURE — 99284 EMERGENCY DEPT VISIT MOD MDM: CPT | Performed by: EMERGENCY MEDICINE

## 2020-10-26 PROCEDURE — 80048 BASIC METABOLIC PNL TOTAL CA: CPT | Performed by: EMERGENCY MEDICINE

## 2020-12-29 ENCOUNTER — OFFICE VISIT (OUTPATIENT)
Dept: FAMILY MEDICINE CLINIC | Facility: HOSPITAL | Age: 80
End: 2020-12-29
Payer: MEDICARE

## 2020-12-29 VITALS
WEIGHT: 240 LBS | OXYGEN SATURATION: 97 % | HEIGHT: 69 IN | DIASTOLIC BLOOD PRESSURE: 90 MMHG | SYSTOLIC BLOOD PRESSURE: 162 MMHG | BODY MASS INDEX: 35.55 KG/M2 | HEART RATE: 84 BPM

## 2020-12-29 DIAGNOSIS — I10 BENIGN ESSENTIAL HYPERTENSION: ICD-10-CM

## 2020-12-29 DIAGNOSIS — I10 ESSENTIAL HYPERTENSION: ICD-10-CM

## 2020-12-29 DIAGNOSIS — F02.80 ALZHEIMER'S DISEASE OF OTHER ONSET WITHOUT BEHAVIORAL DISTURBANCE: ICD-10-CM

## 2020-12-29 DIAGNOSIS — N95.0 ABNORMAL VAGINAL BLEEDING IN POSTMENOPAUSAL PATIENT: ICD-10-CM

## 2020-12-29 DIAGNOSIS — Z01.818 PRE-OP EXAMINATION: Primary | ICD-10-CM

## 2020-12-29 DIAGNOSIS — G30.8 ALZHEIMER'S DISEASE OF OTHER ONSET WITHOUT BEHAVIORAL DISTURBANCE: ICD-10-CM

## 2020-12-29 DIAGNOSIS — E78.00 PURE HYPERCHOLESTEROLEMIA: ICD-10-CM

## 2020-12-29 PROBLEM — E78.49 OTHER HYPERLIPIDEMIA: Status: RESOLVED | Noted: 2018-10-22 | Resolved: 2020-12-29

## 2020-12-29 PROBLEM — Z00.00 HEALTH CARE MAINTENANCE: Status: RESOLVED | Noted: 2019-04-24 | Resolved: 2020-12-29

## 2020-12-29 LAB — # OF QRS COMPLEXES: ABNORMAL MS

## 2020-12-29 PROCEDURE — 93000 ELECTROCARDIOGRAM COMPLETE: CPT | Performed by: INTERNAL MEDICINE

## 2020-12-29 PROCEDURE — 99214 OFFICE O/P EST MOD 30 MIN: CPT | Performed by: INTERNAL MEDICINE

## 2020-12-29 RX ORDER — AMLODIPINE BESYLATE 5 MG/1
5 TABLET ORAL DAILY
Qty: 90 TABLET | Refills: 2 | Status: SHIPPED | OUTPATIENT
Start: 2020-12-29 | End: 2021-12-25

## 2020-12-29 RX ORDER — MEMANTINE HYDROCHLORIDE 5 MG/1
5 TABLET ORAL 2 TIMES DAILY
COMMUNITY

## 2021-03-14 ENCOUNTER — HOSPITAL ENCOUNTER (OUTPATIENT)
Facility: HOSPITAL | Age: 81
Setting detail: OBSERVATION
Discharge: HOME | End: 2021-03-15
Attending: EMERGENCY MEDICINE | Admitting: INTERNAL MEDICINE
Payer: MEDICARE

## 2021-03-14 ENCOUNTER — APPOINTMENT (EMERGENCY)
Dept: RADIOLOGY | Facility: HOSPITAL | Age: 81
End: 2021-03-14
Payer: MEDICARE

## 2021-03-14 DIAGNOSIS — F03.90 DEMENTIA (HCC): ICD-10-CM

## 2021-03-14 DIAGNOSIS — M25.562 LEFT KNEE PAIN: Primary | ICD-10-CM

## 2021-03-14 DIAGNOSIS — R26.2 AMBULATORY DYSFUNCTION: ICD-10-CM

## 2021-03-14 PROBLEM — H35.30 MACULAR DEGENERATION: Status: ACTIVE | Noted: 2020-02-07

## 2021-03-14 PROBLEM — G30.0 EARLY ONSET ALZHEIMER'S DEMENTIA WITHOUT BEHAVIORAL DISTURBANCE (HCC): Status: ACTIVE | Noted: 2020-09-04

## 2021-03-14 PROBLEM — F02.80 EARLY ONSET ALZHEIMER'S DEMENTIA WITHOUT BEHAVIORAL DISTURBANCE (HCC): Status: ACTIVE | Noted: 2020-09-04

## 2021-03-14 LAB
ANION GAP SERPL CALCULATED.3IONS-SCNC: 7 MMOL/L (ref 4–13)
BASOPHILS # BLD AUTO: 0.04 THOUSANDS/ΜL (ref 0–0.1)
BASOPHILS NFR BLD AUTO: 1 % (ref 0–1)
BUN SERPL-MCNC: 21 MG/DL (ref 5–25)
CALCIUM SERPL-MCNC: 8.9 MG/DL (ref 8.3–10.1)
CHLORIDE SERPL-SCNC: 109 MMOL/L (ref 100–108)
CO2 SERPL-SCNC: 29 MMOL/L (ref 21–32)
CREAT SERPL-MCNC: 0.97 MG/DL (ref 0.6–1.3)
EOSINOPHIL # BLD AUTO: 0.15 THOUSAND/ΜL (ref 0–0.61)
EOSINOPHIL NFR BLD AUTO: 2 % (ref 0–6)
ERYTHROCYTE [DISTWIDTH] IN BLOOD BY AUTOMATED COUNT: 12.8 % (ref 11.6–15.1)
GFR SERPL CREATININE-BSD FRML MDRD: 56 ML/MIN/1.73SQ M
GLUCOSE SERPL-MCNC: 84 MG/DL (ref 65–140)
HCT VFR BLD AUTO: 42.5 % (ref 34.8–46.1)
HGB BLD-MCNC: 14.1 G/DL (ref 11.5–15.4)
IMM GRANULOCYTES # BLD AUTO: 0.03 THOUSAND/UL (ref 0–0.2)
IMM GRANULOCYTES NFR BLD AUTO: 0 % (ref 0–2)
LYMPHOCYTES # BLD AUTO: 1.79 THOUSANDS/ΜL (ref 0.6–4.47)
LYMPHOCYTES NFR BLD AUTO: 24 % (ref 14–44)
MCH RBC QN AUTO: 30.9 PG (ref 26.8–34.3)
MCHC RBC AUTO-ENTMCNC: 33.2 G/DL (ref 31.4–37.4)
MCV RBC AUTO: 93 FL (ref 82–98)
MONOCYTES # BLD AUTO: 0.65 THOUSAND/ΜL (ref 0.17–1.22)
MONOCYTES NFR BLD AUTO: 9 % (ref 4–12)
NEUTROPHILS # BLD AUTO: 4.8 THOUSANDS/ΜL (ref 1.85–7.62)
NEUTS SEG NFR BLD AUTO: 64 % (ref 43–75)
NRBC BLD AUTO-RTO: 0 /100 WBCS
PLATELET # BLD AUTO: 178 THOUSANDS/UL (ref 149–390)
PMV BLD AUTO: 11.9 FL (ref 8.9–12.7)
POTASSIUM SERPL-SCNC: 4.4 MMOL/L (ref 3.5–5.3)
RBC # BLD AUTO: 4.57 MILLION/UL (ref 3.81–5.12)
SODIUM SERPL-SCNC: 145 MMOL/L (ref 136–145)
WBC # BLD AUTO: 7.46 THOUSAND/UL (ref 4.31–10.16)

## 2021-03-14 PROCEDURE — 99285 EMERGENCY DEPT VISIT HI MDM: CPT | Performed by: PHYSICIAN ASSISTANT

## 2021-03-14 PROCEDURE — 36415 COLL VENOUS BLD VENIPUNCTURE: CPT | Performed by: PHYSICIAN ASSISTANT

## 2021-03-14 PROCEDURE — 99284 EMERGENCY DEPT VISIT MOD MDM: CPT

## 2021-03-14 PROCEDURE — 96372 THER/PROPH/DIAG INJ SC/IM: CPT

## 2021-03-14 PROCEDURE — 85025 COMPLETE CBC W/AUTO DIFF WBC: CPT | Performed by: PHYSICIAN ASSISTANT

## 2021-03-14 PROCEDURE — 73564 X-RAY EXAM KNEE 4 OR MORE: CPT

## 2021-03-14 PROCEDURE — 1123F ACP DISCUSS/DSCN MKR DOCD: CPT | Performed by: PHYSICIAN ASSISTANT

## 2021-03-14 PROCEDURE — 80048 BASIC METABOLIC PNL TOTAL CA: CPT | Performed by: PHYSICIAN ASSISTANT

## 2021-03-14 PROCEDURE — 99220 PR INITIAL OBSERVATION CARE/DAY 70 MINUTES: CPT | Performed by: INTERNAL MEDICINE

## 2021-03-14 RX ORDER — METOPROLOL TARTRATE 5 MG/5ML
5 INJECTION INTRAVENOUS EVERY 6 HOURS PRN
Status: DISCONTINUED | OUTPATIENT
Start: 2021-03-14 | End: 2021-03-15 | Stop reason: HOSPADM

## 2021-03-14 RX ORDER — ACETAMINOPHEN 325 MG/1
650 TABLET ORAL ONCE
Status: COMPLETED | OUTPATIENT
Start: 2021-03-14 | End: 2021-03-14

## 2021-03-14 RX ORDER — KETOROLAC TROMETHAMINE 30 MG/ML
15 INJECTION, SOLUTION INTRAMUSCULAR; INTRAVENOUS ONCE
Status: COMPLETED | OUTPATIENT
Start: 2021-03-14 | End: 2021-03-14

## 2021-03-14 RX ORDER — MEMANTINE HYDROCHLORIDE 5 MG/1
5 TABLET ORAL 2 TIMES DAILY
Status: DISCONTINUED | OUTPATIENT
Start: 2021-03-14 | End: 2021-03-15 | Stop reason: HOSPADM

## 2021-03-14 RX ORDER — SILICONE ADHESIVE 1.5" X 3"
2 SHEET (EA) TOPICAL DAILY
Status: DISCONTINUED | OUTPATIENT
Start: 2021-03-14 | End: 2021-03-15 | Stop reason: HOSPADM

## 2021-03-14 RX ORDER — PRAVASTATIN SODIUM 40 MG
40 TABLET ORAL
Status: DISCONTINUED | OUTPATIENT
Start: 2021-03-14 | End: 2021-03-15 | Stop reason: HOSPADM

## 2021-03-14 RX ORDER — AMLODIPINE BESYLATE 5 MG/1
5 TABLET ORAL DAILY
Status: DISCONTINUED | OUTPATIENT
Start: 2021-03-14 | End: 2021-03-15 | Stop reason: HOSPADM

## 2021-03-14 RX ORDER — LOSARTAN POTASSIUM 50 MG/1
100 TABLET ORAL DAILY
Status: DISCONTINUED | OUTPATIENT
Start: 2021-03-14 | End: 2021-03-15 | Stop reason: HOSPADM

## 2021-03-14 RX ADMIN — AMLODIPINE BESYLATE 5 MG: 5 TABLET ORAL at 16:10

## 2021-03-14 RX ADMIN — PRAVASTATIN SODIUM 40 MG: 40 TABLET ORAL at 16:11

## 2021-03-14 RX ADMIN — MEMANTINE 5 MG: 5 TABLET ORAL at 18:04

## 2021-03-14 RX ADMIN — ACETAMINOPHEN 650 MG: 325 TABLET, FILM COATED ORAL at 13:21

## 2021-03-14 RX ADMIN — SODIUM CHLORIDE 2 DROP: 50 SOLUTION/ DROPS OPHTHALMIC at 18:04

## 2021-03-14 RX ADMIN — KETOROLAC TROMETHAMINE 15 MG: 30 INJECTION, SOLUTION INTRAMUSCULAR; INTRAVENOUS at 13:20

## 2021-03-14 RX ADMIN — ENOXAPARIN SODIUM 40 MG: 40 INJECTION SUBCUTANEOUS at 16:10

## 2021-03-14 RX ADMIN — LOSARTAN POTASSIUM 100 MG: 50 TABLET, FILM COATED ORAL at 16:11

## 2021-03-14 NOTE — H&P
Bo [facility]  H&P- Yadira Henderson 1940, 80 y o  female MRN: [id number]  Unit/Bed#: -01 Encounter: [id number]  Primary Care Provider: Sylvie Romero MD   Date and time admitted to hospital: 3/14/2021  1:02 PM    * Left knee pain  Assessment & Plan  · No acute osseous abnormality based on initial imaging  · Will ask Orthopedics to evaluate given severe ambulatory dysfunction secondary to pain and patient providing poor history  · PT/OT  · May require rehab upon discharge    Benign essential hypertension  Assessment & Plan  · 194/79 in emergency department, now improved to 121 systolic  · May be secondary to pain  · Continue home valsartan and amlodipine  · Continue increasing Amlodipine of pressures remain elevated  · Will give IV Lopressor p r n  Macular degeneration  Assessment & Plan  · Continue hypertonic drops daily    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)  Assessment & Plan  · Supportive care  · Optimize sleep-wake cycles  · Continue Namenda    Class 1 obesity due to excess calories with serious comorbidity and body mass index (BMI) of 32 0 to 32 9 in adult  Assessment & Plan  · Dietary changes and lifestyle modifications    Hyperlipidemia  Assessment & Plan  · Continue statin    VTE Prophylaxis: Enoxaparin (Lovenox)  / sequential compression device   Code Status: DNR/DNI  POLST: POLST form is not discussed and not completed at this time  Discussion with family:  Discussed with patient's friend at bedside,    Anticipated Length of Stay:  Patient will be admitted on an Observation basis with an anticipated length of stay of  Less than  2 midnights  Justification for Hospital Stay: ambulatory dysfunction    Total Time for Visit, including Counseling / Coordination of Care: 30 minutes  Greater than 50% of this total time spent on direct patient counseling and coordination of care      Chief Complaint:   Left knee pain    History of Present Illness:    Keila Hewitt is a 80 y o  female with past medical history of hypertension, hyperlipidemia, Alzheimer's dementia who presents with ambulatory dysfunction  Patient's friend/neighbor visited her today and noted she was having difficulty ambulating  Patient reported pain in her left knee but denied injury or excessive strain to her memory  Patient is a poor historian  Initial x-rays in emergency department negative for any osseous abnormality  Family on comfortable with discharge home  Patient admitted for orthopedic evaluation, PT/OT evaluation and recommendations  Admission labs unremarkable  No additional complaints  Denies chest pain, nausea, vomiting, diarrhea, fever or chills  Feels at baseline physical health with the exception of her left knee  Review of Systems:    Review of Systems    Past Medical and Surgical History:     Past Medical History:   Diagnosis Date    Degenerative joint disease (DJD) of hip     Hyperlipidemia     Hypertension     Osteopenia     Osteoporosis        Past Surgical History:   Procedure Laterality Date    EYE SURGERY      HIP SURGERY      JOINT REPLACEMENT      right hip       Meds/Allergies:    Prior to Admission medications    Medication Sig Start Date End Date Taking?  Authorizing Provider   amLODIPine (NORVASC) 5 mg tablet Take 1 tablet (5 mg total) by mouth daily 12/29/20   Adrian Bustamante MD   calcium carbonate-vitamin D (OSCAL-D) 500 mg-200 units per tablet Take 2 tablets by mouth daily with breakfast    Historical Provider, MD   memantine (NAMENDA) 5 mg tablet Take 5 mg by mouth 2 (two) times a day    Historical Provider, MD   Multiple Vitamins-Minerals (PRESERVISION AREDS) CAPS Take 2 capsules by mouth daily 5/12/15   Historical Provider, MD   simvastatin (ZOCOR) 20 mg tablet TAKE 1 TABLET BY MOUTH EVERY DAY 4/21/20   Adrian Bustamante MD   sodium chloride (DEEJAY 128) 2 % hypertonic ophthalmic solution 1 drop once     Historical Provider, MD   valsartan (DIOVAN) 160 mg tablet Take 1 tablet (160 mg total) by mouth daily 3/30/20   Matias Su MD     I have reviewed home medications with patient personally  Allergies: Allergies   Allergen Reactions    Ace Inhibitors Cough    Dust Mite Extract     Eggs Or Egg-Derived Products     Fexofenadine      [facility] - [id number]: ZOMBIE    Lisinopril Other (See Comments)     Got very cold  chills    Mold Extract  [Trichophyton]     Pollen Extract     Padilla Grass Pollen Allergen     Tree Extract     Penicillins Rash and Hives       Social History:     Marital Status: /Civil Union   Occupation: retired  Patient Pre-hospital Living Situation: home  Patient Pre-hospital Level of Mobility: independent  Patient Pre-hospital Diet Restrictions: none  Substance Use History:   Social History     Substance and Sexual Activity   Alcohol Use No    Comment: Per Allscripts:  Occasional alcohol     Social History     Tobacco Use   Smoking Status Never Smoker   Smokeless Tobacco Never Used     Social History     Substance and Sexual Activity   Drug Use No       Family History:    Family History   Problem Relation Age of Onset    Breast cancer Mother     Substance Abuse Neg Hx     Mental illness Neg Hx        Physical Exam:     Vitals:   Blood Pressure: 169/90 (03/14/21 1543)  Pulse: 82 (03/14/21 1543)  Temperature: 98 4 °F (36 9 °C) (03/14/21 1543)  Temp Source: Temporal (03/14/21 1307)  Respirations: 17 (03/14/21 1543)  Height: 5' 9" (175 3 cm) (03/14/21 1307)  Weight - Scale: 109 kg (240 lb) (03/14/21 1307)  SpO2: 98 % (03/14/21 1543)    Physical Exam  Constitutional:       General: She is not in acute distress  Appearance: Normal appearance  She is well-developed  HENT:      Head: Normocephalic and atraumatic  Eyes:      General: No scleral icterus  Conjunctiva/sclera: Conjunctivae normal    Cardiovascular:      Rate and Rhythm: Normal rate and regular rhythm        Heart sounds: No murmur  Pulmonary:      Effort: Pulmonary effort is normal       Breath sounds: No wheezing, rhonchi or rales  Abdominal:      General: There is no distension  Palpations: Abdomen is soft  Musculoskeletal:      Left knee: Tenderness found  Lateral joint line tenderness noted  Skin:     General: Skin is warm and dry  Neurological:      General: No focal deficit present  Mental Status: She is alert  Psychiatric:         Mood and Affect: Mood normal          Additional Data:     Lab Results: I have personally reviewed pertinent reports  Results from last 7 days   Lab Units 03/14/21  1418   WBC Thousand/uL 7 46   HEMOGLOBIN g/dL 14 1   HEMATOCRIT % 42 5   PLATELETS Thousands/uL 178   NEUTROS PCT % 64   LYMPHS PCT % 24   MONOS PCT % 9   EOS PCT % 2     Results from last 7 days   Lab Units 03/14/21  1418   SODIUM mmol/L 145   POTASSIUM mmol/L 4 4   CHLORIDE mmol/L 109*   CO2 mmol/L 29   BUN mg/dL 21   CREATININE mg/dL 0 97   ANION GAP mmol/L 7   CALCIUM mg/dL 8 9   GLUCOSE RANDOM mg/dL 84                       Imaging: I have personally reviewed pertinent reports  XR knee 4+ vw left injury   ED Interpretation by Tor Upton PA-C (03/14 1340)   No acute osseus abnormality      Final Result by Jessica Taylor DO (03/14 1447)   No acute osseous abnormality  Workstation performed: [id number]          EKG, Pathology, and Other Studies Reviewed on Admission:   · EKG: none this admission    Allscripts / Epic Records Reviewed: Yes     ** Please Note: This note has been constructed using a voice recognition system   **

## 2021-03-14 NOTE — ASSESSMENT & PLAN NOTE
· 194/79 in emergency department, now improved to 568 systolic  · May be secondary to pain  · Continue home valsartan and amlodipine  · Continue increasing Amlodipine of pressures remain elevated  · Will give IV Lopressor p r n

## 2021-03-14 NOTE — PLAN OF CARE
Problem: Potential for Falls  Goal: Patient will remain free of falls  Description: INTERVENTIONS:  - Assess patient frequently for physical needs  -  Identify cognitive and physical deficits and behaviors that affect risk of falls  -  [place] fall precautions as indicated by assessment   - Educate patient/family on patient safety including physical limitations  - Instruct patient to call for assistance with activity based on assessment  - Modify environment to reduce risk of injury  - Consider OT/PT consult to assist with strengthening/mobility  3/14/2021 1456 by Jose Ty  Outcome: Progressing  3/14/2021 1456 by Jose Ty  Outcome: Progressing     Problem: PAIN - ADULT  Goal: Verbalizes/displays adequate comfort level or baseline comfort level  Description: Interventions:  - Encourage patient to monitor pain and request assistance  - Assess pain using appropriate pain scale  - Administer analgesics based on type and severity of pain and evaluate response  - Implement non-pharmacological measures as appropriate and evaluate response  - Consider cultural and social influences on pain and pain management  - Notify physician/advanced practitioner if interventions unsuccessful or patient reports new pain  3/14/2021 1456 by Jose Ty  Outcome: Progressing  3/14/2021 1456 by Jose Ty  Outcome: Progressing     Problem: SAFETY ADULT  Goal: Patient will remain free of falls  Description: INTERVENTIONS:  - Assess patient frequently for physical needs  -  Identify cognitive and physical deficits and behaviors that affect risk of falls    -  [place] fall precautions as indicated by assessment   - Educate patient/family on patient safety including physical limitations  - Instruct patient to call for assistance with activity based on assessment  - Modify environment to reduce risk of injury  - Consider OT/PT consult to assist with strengthening/mobility  3/14/2021 1456 by Jose Ty  Outcome: Progressing  3/14/2021 1456 by Vladimir Claire  Outcome: Progressing  Goal: Maintain or return to baseline ADL function  Description: INTERVENTIONS:  -  Assess patient's ability to carry out ADLs; assess patient's baseline for ADL function and identify physical deficits which impact ability to perform ADLs (bathing, care of mouth/teeth, toileting, grooming, dressing, etc )  - Assess/evaluate cause of self-care deficits   - Assess range of motion  - Assess patient's mobility; develop plan if impaired  - Assess patient's need for assistive devices and provide as appropriate  - Encourage maximum independence but intervene and supervise when necessary  - Involve family in performance of ADLs  - Assess for home care needs following discharge   - Consider OT consult to assist with ADL evaluation and planning for discharge  - Provide patient education as appropriate  3/14/2021 1456 by Vladimir Claire  Outcome: Progressing  3/14/2021 1456 by Vladimir Claire  Outcome: Progressing  Goal: Maintain or return mobility status to optimal level  Description: INTERVENTIONS:  - Assess patient's baseline mobility status (ambulation, transfers, stairs, etc )    - Identify cognitive and physical deficits and behaviors that affect mobility  - Identify mobility aids required to assist with transfers and/or ambulation (gait belt, sit-to-stand, lift, walker, cane, etc )  - [place] fall precautions as indicated by assessment  - Record patient progress and toleration of activity level on Mobility SBAR; progress patient to next Phase/Stage  - Instruct patient to call for assistance with activity based on assessment  - Consider rehabilitation consult to assist with strengthening/weightbearing, etc   3/14/2021 1456 by Vladimir Claire  Outcome: Progressing  3/14/2021 1456 by Vladimir Claire  Outcome: Progressing     Problem: DISCHARGE PLANNING  Goal: Discharge to home or other facility with appropriate resources  Description: INTERVENTIONS:  - Identify barriers to discharge w/patient and caregiver  - Arrange for needed discharge resources and transportation as appropriate  - Identify discharge learning needs (meds, wound care, etc )  - Arrange for interpretive services to assist at discharge as needed  - Refer to Case Management Department for coordinating discharge planning if the patient needs post-hospital services based on physician/advanced practitioner order or complex needs related to functional status, cognitive ability, or social support system  3/14/2021 1456 by Angelica Mann  Outcome: Progressing  3/14/2021 1456 by Angelica Mann  Outcome: Progressing     Problem: Knowledge Deficit  Goal: Patient/family/caregiver demonstrates understanding of disease process, treatment plan, medications, and discharge instructions  Description: Complete learning assessment and assess knowledge base    Interventions:  - Provide teaching at level of understanding  - Provide teaching via preferred learning methods  3/14/2021 1456 by Angelica Mann  Outcome: Progressing  3/14/2021 1456 by Angelica Mann  Outcome: Progressing     Problem: MUSCULOSKELETAL - ADULT  Goal: Maintain or return mobility to safest level of function  Description: INTERVENTIONS:  - Assess patient's ability to carry out ADLs; assess patient's baseline for ADL function and identify physical deficits which impact ability to perform ADLs (bathing, care of mouth/teeth, toileting, grooming, dressing, etc )  - Assess/evaluate cause of self-care deficits   - Assess range of motion  - Assess patient's mobility  - Assess patient's need for assistive devices and provide as appropriate  - Encourage maximum independence but intervene and supervise when necessary  - Involve family in performance of ADLs  - Assess for home care needs following discharge   - Consider OT consult to assist with ADL evaluation and planning for discharge  - Provide patient education as appropriate  3/14/2021 1456 by Angelica Mann  Outcome: Progressing  3/14/2021 1456 by Kendra Lozano  Outcome: Progressing  Goal: Maintain proper alignment of affected body part  Description: INTERVENTIONS:  - Support, maintain and protect limb and body alignment  - Provide patient/ family with appropriate education  3/14/2021 1456 by Kendra Lozano  Outcome: Progressing  3/14/2021 1456 by Kendra Lozano  Outcome: Progressing

## 2021-03-14 NOTE — ASSESSMENT & PLAN NOTE
· No acute osseous abnormality based on initial imaging  · Will ask Orthopedics to evaluate given severe ambulatory dysfunction secondary to pain and patient providing poor history  · PT/OT  · May require rehab upon discharge

## 2021-03-14 NOTE — ED PROVIDER NOTES
History  Chief Complaint   Patient presents with    Knee Pain     Pt with left knee pain x 1 week  Denies fall/injury  79 yo female w/ hx of HTN, HLD and early onset Alzheimer's dementia presents to the Emergency Department from home for evaluation of L knee pain  Pt is present with a friend; friend states they came to visit the pt this AM and noticed she was not ambulating well  Pt states she has had ongoing pain x 1-2 weeks, denies trauma  She is a poor historian secondary to dementia  No other complaints  Specifically she denies fever, chills, sweats, CP, SOB, N/V  No meds taken at home for pain  No leg edema  On exam, L knee is swollen with questionable joint effusion  ROM is intact with pain throughout, no obvious crepitus  No pedal edema  Special tests limited by body habitus    A/P: L knee pain  Give NSAIDs/APAP for pain, XR  Likely acute on chronic OA, less likely fx if no trauma however dementia limits reliable hx  Will assess ambulatory stability after pain control          Prior to Admission Medications   Prescriptions Last Dose Informant Patient Reported? Taking?    Multiple Vitamins-Minerals (PRESERVISION AREDS) CAPS   Yes No   Sig: Take 2 capsules by mouth daily   amLODIPine (NORVASC) 5 mg tablet   No No   Sig: Take 1 tablet (5 mg total) by mouth daily   calcium carbonate-vitamin D (OSCAL-D) 500 mg-200 units per tablet   Yes No   Sig: Take 2 tablets by mouth daily with breakfast   memantine (NAMENDA) 5 mg tablet   Yes No   Sig: Take 5 mg by mouth 2 (two) times a day   simvastatin (ZOCOR) 20 mg tablet   No No   Sig: TAKE 1 TABLET BY MOUTH EVERY DAY   sodium chloride (DEEJAY 128) 2 % hypertonic ophthalmic solution   Yes No   Si drop once    valsartan (DIOVAN) 160 mg tablet   No No   Sig: Take 1 tablet (160 mg total) by mouth daily      Facility-Administered Medications: None       Past Medical History:   Diagnosis Date    Degenerative joint disease (DJD) of hip     Hyperlipidemia     Hypertension     Osteopenia     Osteoporosis        Past Surgical History:   Procedure Laterality Date    EYE SURGERY      HIP SURGERY      JOINT REPLACEMENT      right hip       Family History   Problem Relation Age of Onset    Breast cancer Mother     Substance Abuse Neg Hx     Mental illness Neg Hx      I have reviewed and agree with the history as documented  E-Cigarette/Vaping     E-Cigarette/Vaping Substances    Nicotine No     THC No     CBD No     Flavoring No     Other No     Unknown No      Social History     Tobacco Use    Smoking status: Never Smoker    Smokeless tobacco: Never Used   Substance Use Topics    Alcohol use: No     Comment: Per [employer]:  Occasional alcohol    Drug use: No       Review of Systems   Constitutional: Negative for fever  Musculoskeletal: Positive for arthralgias and gait problem  Negative for joint swelling and myalgias  Skin: Negative for color change and wound  Neurological: Negative for weakness and numbness  All other systems reviewed and are negative  Physical Exam  Physical Exam  Vitals signs reviewed  Constitutional:       General: She is not in acute distress  Appearance: She is well-developed  She is not diaphoretic  HENT:      Head: Normocephalic and atraumatic  Mouth/Throat:      Mouth: Mucous membranes are moist    Eyes:      General: No scleral icterus  Pupils: Pupils are equal, round, and reactive to light  Cardiovascular:      Rate and Rhythm: Normal rate and regular rhythm  Heart sounds: No murmur  No friction rub  No gallop  Pulmonary:      Effort: No respiratory distress  Breath sounds: No wheezing or rales  Musculoskeletal:      Left hip: She exhibits normal range of motion and normal strength  Left knee: She exhibits swelling  She exhibits normal range of motion, normal alignment and normal meniscus  No tenderness found  Right lower leg: No edema  Left lower leg: No edema  Skin:     General: Skin is dry  Capillary Refill: Capillary refill takes less than 2 seconds  Neurological:      General: No focal deficit present  Comments: Oriented to person and place, disoriented to time   Psychiatric:         Mood and Affect: Mood normal          Behavior: Behavior normal          Vital Signs  ED Triage Vitals   Temperature Pulse Respirations Blood Pressure SpO2   03/14/21 1307 03/14/21 1307 03/14/21 1307 03/14/21 1311 03/14/21 1307   98 4 °F (36 9 °C) 79 20 (!) 194/79 96 %      Temp Source Heart Rate Source Patient Position - Orthostatic VS BP Location FiO2 (%)   03/14/21 1307 03/14/21 1307 03/14/21 1307 03/14/21 1307 --   Temporal Monitor Sitting Left arm       Pain Score       03/14/21 1307       6           Vitals:    03/14/21 1307 03/14/21 1311   BP:  (!) 194/79   Pulse: 79    Patient Position - Orthostatic VS: Sitting          Visual Acuity      ED Medications  Medications   ketorolac (TORADOL) injection 15 mg (15 mg Intramuscular Given 3/14/21 1320)   acetaminophen (TYLENOL) tablet 650 mg (650 mg Oral Given 3/14/21 1321)       Diagnostic Studies  Results Reviewed     Procedure Component Value Units Date/Time    CBC and differential [[id number]] Collected: 03/14/21 1418    Lab Status: In process Specimen: Blood from Arm, Right Updated: 03/14/21 5497    Basic metabolic panel [[id number]] Collected: 03/14/21 1418    Lab Status: In process Specimen: Blood from Arm, Right Updated: 03/14/21 1422                 XR knee 4+ vw left injury   ED Interpretation by Lindsey Roach PA-C (03/14 1340)   No acute osseus abnormality                 Procedures  Procedures         ED Course  ED Course as of Mar 14 1426   Sun Mar 14, 2021   1354 Pt unable to ambulate without assistance  SBIRT 22yo+      Most Recent Value   SBIRT (24 yo +)   In order to provide better care to our patients, we are screening all of our patients for alcohol and drug use   Would it be okay to ask you these screening questions? Yes Filed at: 03/14/2021 1309   Initial Alcohol Screen: US AUDIT-C    1  How often do you have a drink containing alcohol?  0 Filed at: 03/14/2021 1309   2  How many drinks containing alcohol do you have on a typical day you are drinking? 0 Filed at: 03/14/2021 1309   3a  Male UNDER 65: How often do you have five or more drinks on one occasion? 0 Filed at: 03/14/2021 1309   3b  FEMALE Any Age, or MALE 65+: How often do you have 4 or more drinks on one occassion? 0 Filed at: 03/14/2021 1309   Audit-C Score  0 Filed at: 03/14/2021 1309   STEPHANY: How many times in the past year have you    Used an illegal drug or used a prescription medication for non-medical reasons? Never Filed at: 03/14/2021 1309                    MDM  Number of Diagnoses or Management Options  Ambulatory dysfunction: new and requires workup  Dementia [facility]):   Left knee pain: new and requires workup  Diagnosis management comments: Pt unable to ambulate steadily despite pain control  She is high risk for fall at home in particular given dementia, and per family/friends her  is not functionally capable of managing her in this current state   Will place in observation for PT/OT and ortho evaluations       Amount and/or Complexity of Data Reviewed  Tests in the radiology section of CPT®: ordered and reviewed  Tests in the medicine section of CPT®: ordered and reviewed  Review and summarize past medical records: yes  Discuss the patient with other providers: yes  Independent visualization of images, tracings, or specimens: yes        Disposition  Final diagnoses:   Left knee pain   Ambulatory dysfunction   Dementia ([facility] [address] )     Time reflects when diagnosis was documented in both MDM as applicable and the Disposition within this note     Time User Action Codes Description Comment    3/14/2021  2:18 PM Jose Larson Add [M82 489] Left knee pain     3/14/2021  2:19 PM Jose Larson Add [R26 2] Ambulatory dysfunction     3/14/2021  2:19 PM Kenneth Chow Add [F03 90] Dementia [facility])       ED Disposition     ED Disposition Condition Date/Time Comment    Admit Stable Sun Mar 14, 2021  2:18 PM Case was discussed with Dr Mary Ellen Austin and the patient's admission status was agreed to be Admission Status: inpatient status to the service of Dr Mary Ellen Austin   Follow-up Information    None         Patient's Medications   Discharge Prescriptions    No medications on file     No discharge procedures on file      PDMP Review     None          ED Provider  Electronically Signed by           Aditya Avitia PA-C  03/14/21 0198

## 2021-03-15 VITALS
DIASTOLIC BLOOD PRESSURE: 82 MMHG | TEMPERATURE: 97.6 F | HEIGHT: 69 IN | SYSTOLIC BLOOD PRESSURE: 158 MMHG | RESPIRATION RATE: 17 BRPM | OXYGEN SATURATION: 96 % | WEIGHT: 240 LBS | HEART RATE: 72 BPM | BODY MASS INDEX: 35.55 KG/M2

## 2021-03-15 PROCEDURE — 20610 DRAIN/INJ JOINT/BURSA W/O US: CPT | Performed by: PHYSICIAN ASSISTANT

## 2021-03-15 PROCEDURE — NC001 PR NO CHARGE: Performed by: PHYSICIAN ASSISTANT

## 2021-03-15 PROCEDURE — 97167 OT EVAL HIGH COMPLEX 60 MIN: CPT

## 2021-03-15 PROCEDURE — 97116 GAIT TRAINING THERAPY: CPT

## 2021-03-15 PROCEDURE — 97535 SELF CARE MNGMENT TRAINING: CPT

## 2021-03-15 PROCEDURE — 99217 PR OBSERVATION CARE DISCHARGE MANAGEMENT: CPT | Performed by: PHYSICIAN ASSISTANT

## 2021-03-15 PROCEDURE — 97163 PT EVAL HIGH COMPLEX 45 MIN: CPT

## 2021-03-15 PROCEDURE — 99204 OFFICE O/P NEW MOD 45 MIN: CPT | Performed by: ORTHOPAEDIC SURGERY

## 2021-03-15 RX ORDER — LIDOCAINE HYDROCHLORIDE 10 MG/ML
10 INJECTION, SOLUTION EPIDURAL; INFILTRATION; INTRACAUDAL; PERINEURAL ONCE
Status: COMPLETED | OUTPATIENT
Start: 2021-03-15 | End: 2021-03-15

## 2021-03-15 RX ORDER — BETAMETHASONE SODIUM PHOSPHATE AND BETAMETHASONE ACETATE 3; 3 MG/ML; MG/ML
6 INJECTION, SUSPENSION INTRA-ARTICULAR; INTRALESIONAL; INTRAMUSCULAR; SOFT TISSUE ONCE
Status: COMPLETED | OUTPATIENT
Start: 2021-03-15 | End: 2021-03-15

## 2021-03-15 RX ADMIN — LIDOCAINE HYDROCHLORIDE 10 ML: 10 INJECTION, SOLUTION EPIDURAL; INFILTRATION; INTRACAUDAL; PERINEURAL at 09:46

## 2021-03-15 RX ADMIN — MEMANTINE 5 MG: 5 TABLET ORAL at 08:07

## 2021-03-15 RX ADMIN — ENOXAPARIN SODIUM 40 MG: 40 INJECTION SUBCUTANEOUS at 08:06

## 2021-03-15 RX ADMIN — AMLODIPINE BESYLATE 5 MG: 5 TABLET ORAL at 08:06

## 2021-03-15 RX ADMIN — SODIUM CHLORIDE 2 DROP: 50 SOLUTION/ DROPS OPHTHALMIC at 08:07

## 2021-03-15 RX ADMIN — LOSARTAN POTASSIUM 100 MG: 50 TABLET, FILM COATED ORAL at 08:06

## 2021-03-15 RX ADMIN — BETAMETHASONE SODIUM PHOSPHATE AND BETAMETHASONE ACETATE 6 MG: 3; 3 INJECTION, SUSPENSION INTRA-ARTICULAR; INTRALESIONAL; INTRAMUSCULAR at 09:45

## 2021-03-15 NOTE — ASSESSMENT & PLAN NOTE
· No acute osseous abnormality based on initial imaging  · Will ask Orthopedics to evaluate given severe ambulatory dysfunction secondary to pain and patient providing poor history  · Steroid injection performed 3/15, okay for discharge and outpatient follow-up  · PT/OT recommending return home with [facility] services, commode and walker

## 2021-03-15 NOTE — OCCUPATIONAL THERAPY NOTE
Occupational Therapy Evaluation (10:04-10:19 & Treatment Note (10:20-10:33)     Patient Name: Zakia HASTINGS Date: 3/15/2021  Problem List  Principal Problem:    Left knee pain  Active Problems:    Benign essential hypertension    Hyperlipidemia    Class 1 obesity due to excess calories with serious comorbidity and body mass index (BMI) of 32 0 to 32 9 in adult    Early onset [facility]'s dementia without [facility] disturbance [facility] )    Macular degeneration    Past Medical History  Past Medical History:   Diagnosis Date    Degenerative joint disease (DJD) of hip     Hyperlipidemia     Hypertension     Osteopenia     Osteoporosis      Past Surgical History  Past Surgical History:   Procedure Laterality Date    EYE SURGERY      HIP SURGERY      JOINT REPLACEMENT      right hip           03/15/21 1033   OT Last Visit   OT Visit Date 03/15/21   Note Type   Note type Evaluation  (& Treat)   Restrictions/Precautions   Weight Bearing Precautions Per Order No   Other Precautions Fall Risk;Pain   Pain Assessment   Pain Assessment Tool 0-10   Pain Score 7   Pain Location/Orientation Orientation: Left; Location: Knee   Home Living   Type of [address] [place] [address] Two level; Able to live on main level with bedroom/bathroom   Bathroom Shower/Tub Tub/shower unit   Bathroom Toilet Standard   Bathroom Equipment Grab bars in shower   [facility]   Prior Function   Level of [place] Needs assistance with ADLs and functional mobility   Lives With Spouse   Receives Help From Family   ADL Assistance Independent   IADLs Needs assistance   Falls in the last 6 months 0   Vocational Retired   Psychosocial   Psychosocial (WDL) WDL   Subjective   Subjective Pt received sitting in recliner   ADL   Eating Assistance 7  Independent   Eating Deficit Setup   Grooming Assistance 7  Independent   Grooming Deficit Setup   UB Bathing Assistance 5  Supervision/Setup   LB Bathing Assistance 4  Minimal Assistance   UB Dressing Assistance 5  Supervision/Setup   LB Dressing Assistance 4  Minimal Assistance   Toileting Assistance  5  Supervision/Setup   Bed Mobility   Additional Comments Pt received seated in recliner by end of session   Transfers   Sit to Stand 5  Supervision   Additional items Verbal cues;Armrests   Stand to Sit 5  Supervision   Additional items Verbal cues;Armrests   Stand pivot 5  Supervision   Additional items Verbal cues  (RW)   Functional Mobility   Functional Mobility 5  Supervision   Additional Comments RW   Balance   Static Sitting Normal   Dynamic Sitting Good   Static Standing Good   Dynamic Standing Fair +   RUE Assessment   RUE Assessment WFL   LUE Assessment   LUE Assessment WFL   Cognition   Overall Cognitive Status WFL   Arousal/Participation Alert   Attention Within functional limits   Orientation Level Oriented X4   Memory Within functional limits   Following Commands Follows one step commands with increased time or repetition   Assessment   Limitation Decreased ADL status; Decreased self-care trans;Decreased high-level ADLs   Prognosis Good   Assessment Pt is a 80 y o  female [facility], admitted 3/14/2021 w/ Left knee pain  Pt s/p left knee injection by ortho  OT completed extensive review of pt's medical and social history  Comorbidities affecting pt's functional performance at time of assessment include: dementia, ambulatory dysfunction, macular degeneration, etc (see chart for additional hx)  Personal factors affecting pt at time of IE include:difficulty performing ADLS, difficulty performing IADLS  and decreased functional mobility  Prior to admission, pt was living with spouse in house with 1st floor set-up  Pt was I w/  ADLS and IADLS, & required no use of DME PTA   Upon evaluation: Pt requires supervision for functional mobility/transfers, supervision for UB ADLs and supervision-min A for LB ADLS 2* the following deficits impacting occupational performance: weakness, decreased strength, decreased balance, decreased tolerance and increased pain  Pt to benefit from continued skilled OT tx while in the hospital to address deficits as defined above and maximize level of functional independence w ADL's and functional mobility  Occupational Performance areas to address include: bathing/shower, toilet hygiene, functional mobility and household maintenance  Based on findings, pt is of high complexity  The patient's raw score on the AM-PAC Daily Activity inpatient short form is 21, standardized score is 44 27, greater than 39 4  Patients at this level are likely to benefit from DC to home  Please refer to the recommendation of the Occupational Therapist for safe DC planning  At this time, OT recommendations at time of discharge are home OT  Goals   Patient Goals Pt wishes to regain functional mobility   Plan   Treatment Interventions ADL retraining; Compensatory technique education; Functional transfer training;Patient/family training;Equipment evaluation/education   Goal Expiration Date 03/25/21   OT Treatment Day 0   OT Frequency 3-5x/wk   Additional Treatment Session   Treatment Assessment Time In: 9:20  Time Out: 10:33  Total Time: 13 min        S:  Pt received seated in recliner  Pt anticipating home D/C today  Pt receptive to ADL education at this time  O:  Additional functional mobility and ADL training performed  Performed sit<>Stand transfers from recliner with supervision relying on armrests for support  Performed functional mobility into bathroom with supervision with use of RW  Performed toilet transfer with min A x 1  Pt encouraged to rely on adjacent armrest for support as well to extend LLE during transfer to decrease discomfort  PT returned to  with supervision  Pt further educated on use of commode at home for increased independence at home  Pt further educated on safety during bathing        A:  Pt benefits from use of armrests and higher surfaces during transfers  Pt receptive to ADL training  P:  Cont to follow pt 3-5x/week  Recommend home OT, use of commode, use of shower chair and grab bar  Recommendation   OT Discharge Recommendation Home with skilled therapy  (home with family support)   Equipment Recommended Bedside commode; Shower/Tub chair with back ($)   AM-PAC Daily Activity Inpatient   Lower Body Dressing 3   Bathing 3   Toileting 3   Upper Body Dressing 4   Grooming 4   Eating 4   Daily Activity Raw Score 21   Daily Activity Standardized Score (Calc for Raw Score >=11) 44 27   AM-PAC Applied Cognition Inpatient   Following a Speech/Presentation 4   Understanding Ordinary Conversation 4   Taking Medications 4   Remembering Where Things Are Placed or Put Away 4   Remembering List of 4-5 Errands 4   Taking Care of Complicated Tasks 3   Applied Cognition Raw Score 23   Applied Cognition Standardized Score 53 08       Pt will achieve the following goals within 10 days  *Pt will complete UB bathing and dressing with mod I     *Pt will complete LB bathing and dressing with mod I      * Pt will complete toileting w/ mod I w/ G hygiene/thoroughness using DME PRN    *Pt will complete bed mobility with mod I, with bed flat and no side rail to prep for purposeful tasks    *Pt will perform functional transfers with on/off all surfaces with mod I using DME as needed w/ G balance/safety  *Pt will increase standing tolerance to 5 minutes in order to complete sinkside ADL task  *Pt will identify 3-5 fall risks during ADL routine to ensure home safety upon discharge  *Pt will improve functional mobility during ADL/IADL/leisure tasks to mod I using DME as needed w/ G balance/safety       *Assess DME needs       REY Solares/L

## 2021-03-15 NOTE — ASSESSMENT & PLAN NOTE
· 194/79 in emergency department, now improved to 991 systolic  · May be secondary to pain  · Continue home valsartan and amlodipine  · Would consider increasing Amlodipine of pressures remain elevated  · Will give IV Lopressor p r n

## 2021-03-15 NOTE — CASE MANAGEMENT
LOS: 1, Patient is an observation patient and is not a 30 day readmission or a Medicare Bundled patient  Her risk for unplanned readmission is N/A  Met with patient  and her spouse, reviewed the discharge planning process including identifying help at home and patient preference for discharge  Patient shares a 2 story home with her spouse, they uses only the first floor and there are 2 TERENCE  Patient reports being independent of ADL's  She has a RW  [facility] [address] for her meds and reports npo barriers in obtaining her medication  Patient PCP is Socrates Coates  She denies SNF/MH/D&A rehab admission  She reports being followed by [facility]  and wants them to see her again  Referral made to [facility] via [facility]  Patient also needs a BSC, patient is agreeable and has no DME preference  obtained BSC from [facility] consignment closet at [address]  He spouse will transport her home

## 2021-03-15 NOTE — UTILIZATION REVIEW
Initial Clinical Review    Admission: Date/Time/Statement:   Admission Orders (From admission, onward)     Ordered        03/14/21 1419  Place in Observation  Once                   Orders Placed This Encounter   Procedures    Place in Observation     Standing Status:   Standing     Number of Occurrences:   1     Order Specific Question:   Level of Care     Answer:   Med Surg [16]     ED Arrival Information     Expected Arrival Acuity Means of Arrival Escorted By Service Admission Type    - 3/14/2021 13:00 Less Urgent Walk-In Friend Hospitalist Urgent    Arrival Complaint    left leg pain         Chief Complaint   Patient presents with    Knee Pain     Pt with left knee pain x 1 week  Denies fall/injury  Assessment/Plan: Left knee pain  Assessment & Plan  · No acute osseous abnormality based on initial imaging  · Will ask Orthopedics to evaluate given severe ambulatory dysfunction secondary to pain and patient providing poor history  · PT/OT  · May require rehab upon discharge     Benign essential hypertension  Assessment & Plan  · 194/79 in emergency department, now improved to 486 systolic  ?  May be secondary to pain  · Continue home valsartan and amlodipine  · Continue increasing Amlodipine of pressures remain elevated  · Will give IV Lopressor p r n      Macular degeneration  Assessment & Plan  · Continue hypertonic drops daily     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)  Assessment & Plan  · Supportive care  · Optimize sleep-wake cycles  · Continue Namenda     Class 1 obesity due to excess calories with serious comorbidity and body mass index (BMI) of 32 0 to 32 9 in adult  Assessment & Plan  · Dietary changes and lifestyle modifications     Hyperlipidemia  Assessment & Plan  · Continue statin       ED Triage Vitals   Temperature Pulse Respirations Blood Pressure SpO2   03/14/21 1307 03/14/21 1307 03/14/21 1307 03/14/21 1311 03/14/21 1307   98 4 °F (36 9 °C) 79 20 (!) 194/79 96 %      Temp Source Heart Rate Source Patient Position - Orthostatic VS BP Location FiO2 (%)   03/14/21 1307 03/14/21 1307 03/14/21 1307 03/14/21 1307 --   Temporal Monitor Sitting Left arm       Pain Score       03/14/21 1307       6          Wt Readings from Last 1 Encounters:   03/14/21 109 kg (240 lb)     Additional Vital Signs:   03/15/21 07:25:16  97 6 °F (36 4 °C)  72  17  158/82  107  96 %  --  --   03/14/21 15:43:08  98 4 °F (36 9 °C)  82  17  169/90  116  98 %           Pertinent Labs/Diagnostic Test Results:   3/14/2021 Xray left knee - No acute osseous abnormality    Results from last 7 days   Lab Units 03/14/21  1418   WBC Thousand/uL 7 46   HEMOGLOBIN g/dL 14 1   HEMATOCRIT % 42 5   PLATELETS Thousands/uL 178   NEUTROS ABS Thousands/µL 4 80     Results from last 7 days   Lab Units 03/14/21  1418   SODIUM mmol/L 145   POTASSIUM mmol/L 4 4   CHLORIDE mmol/L 109*   CO2 mmol/L 29   ANION GAP mmol/L 7   BUN mg/dL 21   CREATININE mg/dL 0 97   EGFR ml/min/1 73sq m 56   CALCIUM mg/dL 8 9     Results from last 7 days   Lab Units 03/14/21  1418   GLUCOSE RANDOM mg/dL 84     ED Treatment:   Medication Administration from 03/14/2021 1300 to 03/14/2021 1533       Date/Time Order Dose Route Action Comments     03/14/2021 1320 ketorolac (TORADOL) injection 15 mg 15 mg Intramuscular Given      03/14/2021 1321 acetaminophen (TYLENOL) tablet 650 mg 650 mg Oral Given         Past Medical History:   Diagnosis Date    Degenerative joint disease (DJD) of hip     Hyperlipidemia     Hypertension     Osteopenia     Osteoporosis      Present on Admission:   Benign essential hypertension   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)   Hyperlipidemia   Macular degeneration      Admitting Diagnosis: Leg pain [M79 606]  Dementia ([address] ) [F03 90]  Left knee pain [M25 562]  Ambulatory dysfunction [R26 2]  Age/Sex: 80 y o  female  Admission Orders: 3/14/2021 1419 Observation   Scheduled Medications:  amLODIPine, 5 mg, Oral, Daily  enoxaparin, 40 mg, Subcutaneous, Daily  losartan, 100 mg, Oral, Daily  memantine, 5 mg, Oral, BID  pravastatin, 40 mg, Oral, Daily With Dinner  sodium chloride, 2 drop, Both Eyes, Daily    Continuous IV Infusions: none     PRN Meds: not used   metoprolol, 5 mg, Intravenous, Q6H PRN      IP CONSULT TO ORTHOPEDIC SURGERY      Network Utilization Review Department  ATTENTION: Please call with any questions or concerns to [phone] and carefully listen to the prompts so that you are directed to the right person  All voicemails are confidential   Tom Resendiz all requests for admission clinical reviews, approved or denied determinations and any other requests to dedicated fax number below belonging to the campus where the patient is receiving treatment   List of dedicated fax numbers for the Facilities:  [address] DENIALS (Administrative/Medical Necessity) [phone]   [address] (Maternity/NICU/Pediatrics) [phone]   [address] Josephine Park 2385 (  Jennifer Fuchs "Melanie" 103) [address] [place] [address] Caroline Cervantes [address] [place] [address] [phone]

## 2021-03-15 NOTE — DISCHARGE INSTR - AVS FIRST PAGE
Dear Ammy Barlow,     It was our pleasure to care for you here at [facility], [address] [place] [address]  It is our hope that we were always able to exceed the expected standards for your care during your stay  You were hospitalized due to [place]  You were cared for on the 3rd floor by Dafne Silvestre PA-C under the service of Willy Zamorano MD with the [facility] who covers for your primary care physician (PCP), Melida Becker MD, while you were hospitalized  If you have any questions or concerns related to this hospitalization, you may contact us at [phone]  For follow up as well as any medication refills, we recommend that you follow up with your primary care physician  A registered nurse will reach out to you by phone within a few days after your discharge to answer any additional questions that you may have after going home  However, at this time we provide for you here, the most important instructions / recommendations at discharge:     · Notable Medication Adjustments -   · Take Tylenol as needed for knee pain, go not exceed 3g total daily  · Testing Required after Discharge -   · None  · Important follow up information -   · Follow-up with primary care provider within 1 week  · Follow-up with orthopedics within 6 weeks  · Other Instructions -   · Return to hospital if you notice redness or swelling over your knee or pain worsens and becomes uncontrollable  · Please review this entire after visit summary as additional general instructions including medication list, appointments, activity, diet, any pertinent wound care, and other additional recommendations from your care team that may be provided for you        Sincerely,     Dafne Silvestre PA-C

## 2021-03-15 NOTE — PLAN OF CARE
Problem: OCCUPATIONAL THERAPY ADULT  Goal: Performs self-care activities at highest level of function for planned discharge setting  See evaluation for individualized goals  Description: Treatment Interventions: ADL retraining, Compensatory technique education, Functional transfer training, Patient/family training, Equipment evaluation/education  Equipment Recommended: Bedside commode, Shower/Tub chair with back ($)       See flowsheet documentation for full assessment, interventions and recommendations  Note: Limitation: Decreased ADL status, Decreased self-care trans, Decreased high-level ADLs  Prognosis: Good  Assessment: Pt is a 80 y o  female seen for OT evaluation at [facility], admitted 3/14/2021 w/ Left knee pain  Pt s/p left knee injection by ortho  OT completed extensive review of pt's medical and social history  Comorbidities affecting pt's functional performance at time of assessment include: dementia, ambulatory dysfunction, macular degeneration, etc (see chart for additional hx)  Personal factors affecting pt at time of IE include:difficulty performing ADLS, difficulty performing IADLS  and decreased functional mobility  Prior to admission, pt was living with spouse in house with 1st floor set-up  Pt was I w/  ADLS and IADLS, & required no use of DME PTA  Upon evaluation: Pt requires supervision for functional mobility/transfers, supervision for UB ADLs and supervision-min A for LB ADLS 2* the following deficits impacting occupational performance: weakness, decreased strength, decreased balance, decreased tolerance and increased pain  Pt to benefit from continued skilled OT tx while in the hospital to address deficits as defined above and maximize level of functional independence w ADL's and functional mobility  Occupational Performance areas to address include: bathing/shower, toilet hygiene, functional mobility and household maintenance  Based on findings, pt is of high complexity  The patient's raw score on the AM-PAC Daily Activity inpatient short form is 21, standardized score is 44 27, greater than 39 4  Patients at this level are likely to benefit from DC to home  Please refer to the recommendation of the Occupational Therapist for safe DC planning  At this time, OT recommendations at time of discharge are home OT        OT Discharge Recommendation: Home with skilled therapy(home with family support)

## 2021-03-15 NOTE — PROCEDURES
Procedure- Orthopedics   Raj Christensen 80 y o  female MRN: [id number]  Unit/Bed#: -01    Procedure: left knee joint and pes anserine bursa injections    After sterile preparation of the skin overlying the knee an 22 gauge needle was inserted via a anterolateral portal and anteromedial aspect of knee over pes bursa  A mixture of 7 cc 1% lidocaine and 1 cc Betamethasone was injected into the left knee joint and pes anserine bursa without resistance  The needle was withdrawn and a piece of sterile gauze was placed over the sites  Pt tolerated the procedure well and was neurovascularly intact both pre and post procedure      Blossom Pretty PA-C

## 2021-03-15 NOTE — DISCHARGE SUMMARY
New Bhupinder  Discharge- Nicolás Crew 1940, 80 y o  female MRN: [id number]  Unit/Bed#: -01 Encounter: [id number]  Primary Care Provider: Rika Nathan MD   Date and time admitted to hospital: 3/14/2021  1:02 PM    * Left knee pain  Assessment & Plan  · No acute osseous abnormality based on initial imaging  · Will ask Orthopedics to evaluate given severe ambulatory dysfunction secondary to pain and patient providing poor history  · Steroid injection performed 3/15, okay for discharge and outpatient follow-up  · PT/OT recommending return home with VNA services, commode and walker    Benign essential hypertension  Assessment & Plan  · 194/79 in emergency department, now improved to 659 systolic  · May be secondary to pain  · Continue home valsartan and amlodipine  · Would consider increasing Amlodipine of pressures remain elevated  · Will give IV Lopressor p r n      Macular degeneration  Assessment & Plan  · Continue hypertonic drops daily    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)  Assessment & Plan  · Supportive care  · Optimize sleep-wake cycles  · Continue Namenda    Class 1 obesity due to excess calories with serious comorbidity and body mass index (BMI) of 32 0 to 32 9 in adult  Assessment & Plan  · Dietary changes and lifestyle modifications    Hyperlipidemia  Assessment & Plan  · Continue statin      Discharging Physician / Practitioner: Yolie Portillo PA-C  PCP: Rika Nathan MD  Admission Date:   Admission Orders (From admission, onward)     Ordered        03/14/21 1419  Place in Observation  Once                   Discharge Date: 03/15/21    Resolved Problems  Date Reviewed: 3/15/2021    None          Consultations During Hospital Stay:  · Orthopedics    Procedures Performed:   · Steroid injection to left knee 3/15    Significant Findings / Test Results:   · X-ray left knee injury 3/14:  No acute osseous abnormality    Incidental Findings:   · None     Test Results Pending at Discharge (will require follow up): · None     Outpatient Tests Requested:  · None    Complications:  None    Reason for Admission:  Ambulatory dysfunction    Hospital Course:     Melisa Aleman is a 80 y o  female patient with past medical history of hypertension, hyperlipidemia, macular degeneration, early-onset Alzheimer's dementia who originally presented to the hospital on 3/14/2021 due to L knee pain  Patient is a poor historian and cannot recall any injury sustained  When neighbor visited her on day of admission, she noticed the patient had great difficulty ambulating and brought her to the emergency department  Mild tenderness and swelling noted on exam   X-ray was negative for any acute fracture  Orthopedics evaluated patient was able to administer a steroid injection  Patient improved in regards ambulatory status  PT/OT recommended home with VNA services  Medically stable for discharge and appropriate for outpatient follow-up  Patient verbalized understanding in regard to the importance of outpatient follow-up  Please see above list of diagnoses and related plan for additional information  Condition at Discharge: stable     Discharge Day Visit / Exam:     Subjective:  Patient feels better today, requests discharge home  She is ambulating more independently today  Believe she may have done too much exercise on Saturday  Vitals: Blood Pressure: 158/82 (03/15/21 0725)  Pulse: 72 (03/15/21 0725)  Temperature: 97 6 °F (36 4 °C) (03/15/21 0725)  Temp Source: Temporal (03/14/21 1307)  Respirations: 17 (03/15/21 0725)  Height: 5' 9" (175 3 cm) (03/14/21 1307)  Weight - Scale: 109 kg (240 lb) (03/14/21 1307)  SpO2: 96 % (03/15/21 0725)  Exam:   Physical Exam  Constitutional:       General: She is not in acute distress  Appearance: Normal appearance  She is well-developed     HENT:      Head: Normocephalic and atraumatic  Eyes:      General: No scleral icterus  Conjunctiva/sclera: Conjunctivae normal    Cardiovascular:      Rate and Rhythm: Normal rate and regular rhythm  Heart sounds: No murmur  Pulmonary:      Effort: Pulmonary effort is normal       Breath sounds: No wheezing, rhonchi or rales  Abdominal:      General: There is no distension  Palpations: Abdomen is soft  Musculoskeletal:      Left knee: She exhibits swelling  Tenderness found  Skin:     General: Skin is warm and dry  Neurological:      General: No focal deficit present  Mental Status: She is alert  Psychiatric:         Mood and Affect: Mood normal        Discussion with Family: Called patient's , Henry Cartwright, no answer  However, PT/OT was able to speak with him in regards to return home with PT/OT and he was agreeable  Discharge instructions/Information to patient and family:   See after visit summary for information provided to patient and family  Provisions for Follow-Up Care:  See after visit summary for information related to follow-up care and any pertinent home health orders  Disposition:     Home with VNA Services (Reminder: Complete face to face encounter)    For Discharges to [facility] SNF:   · Not Applicable to this Patient - Not Applicable to this Patient    Planned Readmission: None     Discharge Statement:  I spent 65 minutes discharging the patient  This time was spent on the day of discharge  I had direct contact with the patient on the day of discharge  Greater than 50% of the total time was spent examining patient, answering all patient questions, arranging and discussing plan of care with patient as well as directly providing post-discharge instructions  Additional time then spent on discharge activities  Discharge Medications:  See after visit summary for reconciled discharge medications provided to patient and family        ** Please Note: This note has been constructed using a voice recognition system **

## 2021-03-15 NOTE — CONSULTS
Consultation - Orthopedics   Riaz Resendiz 80 y o  female MRN: [id number]  Unit/Bed#: -01 Encounter: [id number]      Assessment/Plan     Assessment:  Left knee pain, mild osteoarthritis  Left pes anserine bursitis    Plan:  Recommend left knee joint and left pes anserine bursa cortisone injections today  Please see procedure note  Continue ice to left knee and pain control as per primary team   PT/OT consult  May need assist from cane or walker for ambulation  Hamstring stretching with PT  Follow-up in 6 weeks in clinic for re-evaluation  History of Present Illness   Physician Requesting Consult: Casper Davis MD  Reason for Consult / Principal Problem: left knee pain  HPI: Riaz Resendiz is a 80y o  year old female with early Alzheimer's who presents with left knee pain and ambulatory dysfunction  History is limited by patient due to Alzheimer's  She states that she started having worsening knee pain yesterday morning after attending [employer]  It was difficult for her to get up from the pew due to pain  She denies any pain at rest and states the pain occurs when she is weight-bearing  She denies any recent fall, injury or change in activities  She denies any worsening swelling of the left knee  As per chart a neighbor stopped over yesterday and was concerned advised her to go the emergency room  Patient lives at home with her   She denies walking with any assistive device  She denies any previous treatment to her left knee      Inpatient consult to Orthopedic Surgery  Consult performed by: Mendel Boozer, PA-C  Consult ordered by: Seth Reinoso PA-C          Review of Systems   Unable to perform ROS: Dementia       Historical Information   Past Medical History:   Diagnosis Date    Degenerative joint disease (DJD) of hip     Hyperlipidemia     Hypertension     Osteopenia     Osteoporosis      Past Surgical History:   Procedure Laterality Date    EYE SURGERY      HIP SURGERY      JOINT REPLACEMENT      right hip     Social History   Social History     Substance and Sexual Activity   Alcohol Use Never    Frequency: Never    Binge frequency: Never    Comment: Per [employer]:  Occasional alcohol     Social History     Substance and Sexual Activity   Drug Use No     E-Cigarette/Vaping     E-Cigarette/Vaping Substances    Nicotine No     THC No     CBD No     Flavoring No     Other No     Unknown No      Social History     Tobacco Use   Smoking Status Never Smoker   Smokeless Tobacco Never Used     Family History: non-contributory    Meds/Allergies   all current active meds have been reviewed  Allergies   Allergen Reactions    Ace Inhibitors Cough    Dust Mite Extract     Fexofenadine      [facility] - [id number]: ZOMBIE    Lisinopril Other (See Comments)     Got very cold  chills    Mold Extract  [Trichophyton]     Pollen Extract     Padilla Grass Pollen Allergen     Tree Extract     Penicillins Rash and Hives       Objective   Vitals: Blood pressure 158/82, pulse 72, temperature 97 6 °F (36 4 °C), resp  rate 17, height 5' 9" (1 753 m), weight 109 kg (240 lb), SpO2 96 %  ,Body mass index is 35 44 kg/m²  Intake/Output Summary (Last 24 hours) at 3/15/2021 0820  Last data filed at 3/14/2021 2233  Gross per 24 hour   Intake --   Output 400 ml   Net -400 ml     I/O last 24 hours: In: -   Out: 400 [Urine:400]    Invasive Devices     Peripheral Intravenous Line            Peripheral IV 03/14/21 Right Antecubital less than 1 day                Physical Exam  Constitutional:       General: She is not in acute distress  Appearance: She is well-developed  HENT:      Head: Normocephalic and atraumatic  Eyes:      Conjunctiva/sclera: Conjunctivae normal       Pupils: Pupils are equal, round, and reactive to light  Neck:      Musculoskeletal: Normal range of motion  Cardiovascular:      Rate and Rhythm: Normal rate and regular rhythm        Heart sounds: Normal heart sounds  No murmur  Pulmonary:      Effort: Pulmonary effort is normal  No respiratory distress  Breath sounds: Normal breath sounds  Abdominal:      General: Bowel sounds are normal       Palpations: Abdomen is soft  Tenderness: There is no abdominal tenderness  Musculoskeletal:         General: Tenderness present  Left knee: She exhibits no effusion  Skin:     General: Skin is warm and dry  Neurological:      Mental Status: She is alert and oriented to person, place, and time  Psychiatric:         Mood and Affect: Mood normal        Left Knee Exam     Tenderness   The patient is experiencing tenderness in the medial joint line and pes anserinus  Range of Motion   The patient has normal left knee ROM  Tests   Evans:  Medial - negative Lateral - negative  Varus: negative Valgus: negative    Other   Erythema: absent  Scars: absent  Sensation: normal  Pulse: present  Swelling: mild  Effusion: no effusion present            Lab Results: I have personally reviewed pertinent lab results    Imaging Studies: I have personally reviewed pertinent films in PACS  X-ray left knee: mild osteoarthritis

## 2021-03-15 NOTE — PHYSICAL THERAPY NOTE
PT eval   03/15/21 1025   PT Last Visit   PT Visit Date 03/15/21   Note Type   Note type Evaluation   Pain Assessment   Pain Assessment Tool 0-10   Pain Score 7   Pain Location/Orientation Orientation: Left; Location: Knee   Home Living   Type of [address] [facility] Two level; Able to live on main level with bedroom/bathroom   [address] [facility],[address]  (not sure if available)   Prior Function   Level of [place] Independent with ADLs and functional mobility   Lives With Spouse   Receives Help From Family   ADL Assistance Independent   IADLs Needs assistance   Falls in the last 6 months 0   Vocational Retired   Restrictions/Precautions   [facility] Bearing Precautions Per Order No   Other Precautions Pain; Fall Risk  (L knee just injected pes anserine bursa area)   General   Additional Pertinent History adm for giat dysfunction with L knee pain   Family/Caregiver Present No   Cognition   Overall Cognitive Status WFL   Orientation Level Oriented X4   Following Commands Follows one step commands with increased time or repetition   RUE Assessment   RUE Assessment WFL   LUE Assessment   LUE Assessment WFL   RLE Assessment   RLE Assessment WFL   LLE Assessment   LLE Assessment WFL  (no resisitivie testing knee ext 3/5 rom 0-90)   Coordination   Movements are Fluid and Coordinated 0   Coordination and Movement Description   (L knee bradykinetic)   Proprioception   RLE Proprioception Grossly intact   LLE Proprioception Grossly Intact   Bed Mobility   Additional Comments oob in recliner   Transfers   Sit to Stand 5  Supervision   Additional items Verbal cues   Stand to Sit 5  Supervision   Additional items Verbal cues   Stand pivot 5  Supervision   Additional items Verbal cues  (rw)   Ambulation/Elevation   Gait pattern Improper Weight shift; Forward Flexion;Decreased L stance; Inconsistent stan   Gait Assistance 4  Minimal assist   Additional items Assist x 1;Verbal cues; Tactile cues   Assistive Device None;Rolling walker   Distance 5',10'   Balance   Static Sitting Normal   Dynamic Sitting Good   Static Standing Good   Dynamic Standing Fair   Ambulatory Fair   Endurance Deficit   Endurance Deficit Yes   Endurance Deficit Description due to L knee pain   Activity Tolerance   Activity Tolerance Patient tolerated treatment well;Patient limited by pain   Medical Staff Made Aware OT Desi   Nurse Made Aware Josh Barraza   Assessment   Prognosis Good   Problem List Pain;Decreased mobility   Assessment Pt able to mobilize after L knee injection  REcommend wse of Rw at this time for safe mobility ans to reduce wb on Lle  Pt will benefit from F/u visit for gait training with rw for home d/c with home PT services  Barriers to Discharge   (medical clearance)   Goals   Patient Goals get better   Plan   Treatment/Interventions ADL retraining;Functional transfer training;Gait training;Equipment eval/education;Spoke to case management;OT;Spoke to nursing   PT Frequency Follow-up visit only   Recommendation   PT Discharge Recommendation Home with skilled therapy   Equipment Recommended [place] walker   PT - OK to Discharge Yes   Additional Comments after f/u session   AM-PAC Basic Mobility Inpatient   Turning in Bed Without Bedrails 4   Lying on Back to Sitting on Edge of Flat Bed 4   Moving Bed to Chair 3   Standing Up From Chair 3   Walk in Room 3   Climb 3-5 Stairs 3   Basic Mobility Inpatient Raw Score 20   Basic Mobility Standardized Score 43 99   Modified Chandrika Scale   Modified [place] Scale 2   Jaja Arvizu, PT  PT Tx  Time in 1015  Time out 1025  PT tx continues with rw for safe mobility, gait training with rw 15'x2 to and from BR with S  Able to limit wb on Lle and safely navigate with rw  Reviewed step sequence for single step into home  Pt will check with spouse for RW availability and let Care manager know if needed    D/c PT

## 2021-03-16 ENCOUNTER — TRANSITIONAL CARE MANAGEMENT (OUTPATIENT)
Dept: FAMILY MEDICINE CLINIC | Facility: HOSPITAL | Age: 81
End: 2021-03-16

## 2021-03-17 ENCOUNTER — TELEPHONE (OUTPATIENT)
Dept: FAMILY MEDICINE CLINIC | Facility: HOSPITAL | Age: 81
End: 2021-03-17

## 2021-03-17 NOTE — TELEPHONE ENCOUNTER
Ashlyn Morfin from [address] [place] [address]  opened the patient for home PT today  She has to make you aware that the patient is on two meds that have a level 1 interaction  It is the Amlodipine and Zocor  She just needs confirmation that you are aware of this and that it is ok for the patient to be on both of these meds

## 2021-03-19 ENCOUNTER — TELEPHONE (OUTPATIENT)
Dept: FAMILY MEDICINE CLINIC | Facility: HOSPITAL | Age: 81
End: 2021-03-19

## 2021-03-20 NOTE — TELEPHONE ENCOUNTER
has taken her to [facility] geriatrician  I know nothing about any of this  I did not order VN or home PT  Advise them to contact the geriatrician who ordered these for instructions

## 2021-03-25 ENCOUNTER — OFFICE VISIT (OUTPATIENT)
Dept: FAMILY MEDICINE CLINIC | Facility: HOSPITAL | Age: 81
End: 2021-03-25
Payer: MEDICARE

## 2021-03-25 ENCOUNTER — TELEPHONE (OUTPATIENT)
Dept: FAMILY MEDICINE CLINIC | Facility: HOSPITAL | Age: 81
End: 2021-03-25

## 2021-03-25 VITALS
DIASTOLIC BLOOD PRESSURE: 70 MMHG | WEIGHT: 237 LBS | HEIGHT: 69 IN | BODY MASS INDEX: 35.1 KG/M2 | HEART RATE: 80 BPM | OXYGEN SATURATION: 96 % | SYSTOLIC BLOOD PRESSURE: 150 MMHG

## 2021-03-25 DIAGNOSIS — N95.0 POST-MENOPAUSAL BLEEDING: ICD-10-CM

## 2021-03-25 DIAGNOSIS — G89.29 CHRONIC PAIN OF LEFT KNEE: Primary | ICD-10-CM

## 2021-03-25 DIAGNOSIS — I10 BENIGN ESSENTIAL HYPERTENSION: ICD-10-CM

## 2021-03-25 DIAGNOSIS — G30.0 EARLY ONSET ALZHEIMER'S DEMENTIA WITHOUT BEHAVIORAL DISTURBANCE (HCC): ICD-10-CM

## 2021-03-25 DIAGNOSIS — F02.80 EARLY ONSET ALZHEIMER'S DEMENTIA WITHOUT BEHAVIORAL DISTURBANCE (HCC): ICD-10-CM

## 2021-03-25 DIAGNOSIS — M25.50 PAIN IN JOINT, MULTIPLE SITES: Primary | ICD-10-CM

## 2021-03-25 DIAGNOSIS — M25.562 CHRONIC PAIN OF LEFT KNEE: Primary | ICD-10-CM

## 2021-03-25 PROCEDURE — 99495 TRANSJ CARE MGMT MOD F2F 14D: CPT | Performed by: INTERNAL MEDICINE

## 2021-03-25 RX ORDER — MELOXICAM 7.5 MG/1
7.5 TABLET ORAL DAILY
Qty: 30 TABLET | Refills: 5 | Status: SHIPPED | OUTPATIENT
Start: 2021-03-25

## 2021-03-25 NOTE — PATIENT INSTRUCTIONS
For knee pain, Rx Meloxicam ,  Take 1 daily with breakfast   And also give 2 tylenol  tiwce  With lunch and dinner  Refer to orthopedic  Surgeon for further evaluation  You have been seen today for a hospital discharge follow up visit  The purpose of the visit is to be sure that you are feeling well and taking all medications as ordered  This visit is scheduled so that any post hospital questions you have can be addressed  The doctor or nurse will review all medications and will provide refills of medications  You may be asked to get some follow up labs or other testing done, please do this as soon as able  If  You are seeing a specialist for follow up, let us know so we can do appropriate referrals  Schedule your next routine visit today so that we can keep you on track and healthy

## 2021-03-25 NOTE — PROGRESS NOTES
TCM VISIT     Subjective:    Patient ID: Josefa Hoyos is a 80 y o  female here today for TCM    The following portions of the patient's history were reviewed and updated as appropriate: allergies, current medications, past family history, past medical history, past social history, past surgical history and problem list     Here for ABDON visit  Having pain in knee, some swelling and unable to ambulate well  Xray revealed mild OA and degenerative joint disease  Also note recent post menopausal bleeding and is being evaluated at [facility] for this  Await MRI pelvis results  Review of Systems   Constitutional: Negative for fatigue and fever  HENT: Negative for hearing loss  Eyes: Negative for visual disturbance  Respiratory: Negative for cough, chest tightness, shortness of breath and wheezing  Cardiovascular: Negative for chest pain, palpitations and leg swelling  Gastrointestinal: Negative for abdominal pain, diarrhea and nausea  Genitourinary: Negative for dysuria and hematuria  Musculoskeletal: Positive for arthralgias, gait problem, joint swelling and myalgias  Negative for back pain  Neurological: Negative for dizziness, numbness and headaches  Psychiatric/Behavioral: Negative for confusion and dysphoric mood  All other systems reviewed and are negative        Objective:    Vitals:    03/25/21 0908   BP: 150/70   Pulse: 80   SpO2: 96%   Weight: 108 kg (237 lb)   Height: 5' 9" (1 753 m)       Current Outpatient Medications on File Prior to Visit   Medication Sig Dispense Refill    amLODIPine (NORVASC) 5 mg tablet Take 1 tablet (5 mg total) by mouth daily 90 tablet 2    Apoaequorin (Prevagen) 10 MG CAPS Take 1 tablet by mouth      memantine (NAMENDA) 5 mg tablet Take 5 mg by mouth 2 (two) times a day      Multiple Vitamins-Minerals (PRESERVISION AREDS) CAPS Take 2 capsules by mouth daily      OMEGA-3 FATTY ACIDS PO Take by mouth      simvastatin (ZOCOR) 20 mg tablet TAKE 1 TABLET BY MOUTH EVERY DAY 90 tablet 3    sodium chloride (DEEJAY 128) 2 % hypertonic ophthalmic solution 1 drop once       valsartan (DIOVAN) 160 mg tablet Take 1 tablet (160 mg total) by mouth daily 90 tablet 3    calcium carbonate-vitamin D (OSCAL-D) 500 mg-200 units per tablet Take 2 tablets by mouth daily with breakfast       No current facility-administered medications on file prior to visit  Physical Exam  Constitutional:       Appearance: She is well-developed  HENT:      Head: Normocephalic  Eyes:      Pupils: Pupils are equal, round, and reactive to light  Cardiovascular:      Rate and Rhythm: Normal rate and regular rhythm  Heart sounds: Normal heart sounds  Pulmonary:      Effort: Pulmonary effort is normal       Breath sounds: Normal breath sounds  Abdominal:      General: Bowel sounds are normal       Palpations: Abdomen is soft  Musculoskeletal: Normal range of motion  Skin:     General: Skin is warm and dry  Neurological:      Mental Status: She is alert  Psychiatric:         Behavior: Behavior normal            Transitional Care Management Review:    TCM Call (since 2/22/2021)     Date and time call was made  3/18/2021  1:59 PM    Hospital care reviewed  Records reviewed    Patient was hospitialized at  [facility]        Date of Admission  03/14/21    Date of discharge  03/15/21    Diagnosis  left knee pain     Disposition  Home    Were the patients medications reviewed and updated  Yes    Current Symptoms  None      TCM Call (since 2/22/2021)     Should patient be enrolled in anticoag monitoring? No    Scheduled for follow up?   Yes    Patients specialists  Other (comment)    Other specialists names  Juan José Sousa MD    Did you obtain your prescribed medications  Yes    Do you need help managing your prescriptions or medications  No    Is transportation to your appointment needed  No    I have advised the patient to call PCP with any new or worsening symptoms  Helen Kaiser, [address]     Living Arrangements  Spouse or Significiant other    Support System  Spouse; Friends; Family    Are you recieving any outpatient services  No    Are you recieving home care services  Yes    Types of home care services  Nurse visit            Assessment/Plan:     1  Chronic pain of left knee  Ambulatory referral to Orthopedic Surgery   2  Early onset Alzheimer's dementia without behavioral disturbance ([facility] [address] )     3  Benign essential hypertension     4  Post-menopausal bleeding         Benign essential hypertension  BP acceptable on current regimen    Early onset Alzheimer's dementia without behavioral disturbance [facility])  Seeing geriatrician for med management    Left knee pain  Mild OA on xray  Pain seems out of proportion  Might need further imaging  Refer to ortho  Would not be a good candidate for TKR due to memory issues    Post-menopausal bleeding  Evaluation and follow up at [facility]  MRI pelvis pending        Heidi Elias MD

## 2021-03-25 NOTE — ASSESSMENT & PLAN NOTE
Mild OA on xray  Pain seems out of proportion  Might need further imaging  Refer to ortho    Would not be a good candidate for TKR due to memory issues

## 2021-03-31 ENCOUNTER — TRANSCRIBE ORDERS (OUTPATIENT)
Dept: ADMINISTRATIVE | Facility: HOSPITAL | Age: 81
End: 2021-03-31

## 2021-03-31 DIAGNOSIS — R93.89 THICKENED ENDOMETRIUM: Primary | ICD-10-CM

## 2021-04-07 ENCOUNTER — APPOINTMENT (OUTPATIENT)
Dept: RADIOLOGY | Facility: CLINIC | Age: 81
End: 2021-04-07
Payer: MEDICARE

## 2021-04-07 ENCOUNTER — CONSULT (OUTPATIENT)
Dept: OBGYN CLINIC | Facility: CLINIC | Age: 81
End: 2021-04-07
Payer: MEDICARE

## 2021-04-07 VITALS
DIASTOLIC BLOOD PRESSURE: 82 MMHG | HEART RATE: 82 BPM | SYSTOLIC BLOOD PRESSURE: 126 MMHG | HEIGHT: 69 IN | BODY MASS INDEX: 35.1 KG/M2 | WEIGHT: 237 LBS | TEMPERATURE: 98.4 F

## 2021-04-07 DIAGNOSIS — M25.562 CHRONIC PAIN OF LEFT KNEE: ICD-10-CM

## 2021-04-07 DIAGNOSIS — G89.29 CHRONIC PAIN OF LEFT KNEE: ICD-10-CM

## 2021-04-07 DIAGNOSIS — M17.12 PRIMARY OSTEOARTHRITIS OF LEFT KNEE: Primary | ICD-10-CM

## 2021-04-07 PROCEDURE — 73560 X-RAY EXAM OF KNEE 1 OR 2: CPT

## 2021-04-07 PROCEDURE — 99213 OFFICE O/P EST LOW 20 MIN: CPT | Performed by: ORTHOPAEDIC SURGERY

## 2021-04-07 NOTE — ASSESSMENT & PLAN NOTE
Clinical findings consistent with mild to moderate left knee osteoarthritis  I reviewed patient's left knee x-ray with her and her   I discussed prognosis of her condition  Since she has having intermittent in pain, we will start with conservative treatments of low impact activities like the use of a stationary bike  I discussed her and her  that if her pain gets worse, we can proceed with possible steroid injection or gel injections to help alleviate her pain  She can use Aspercreme or Voltaren gel to help alleviate her pain  I will see her back as needed  All patient's questions were answered to their satisfaction  This note is created using dictation transcription  It may contain typographical errors, grammatical errors, improperly dictated words, background noise and other errors

## 2021-04-07 NOTE — PROGRESS NOTES
Assessment:     1  Primary osteoarthritis of left knee    2  Chronic pain of left knee        Plan:     Problem List Items Addressed This Visit        Musculoskeletal and Integument    Primary osteoarthritis of left knee - Primary     Clinical findings consistent with mild to moderate left knee osteoarthritis  I reviewed patient's left knee x-ray with her and her   I discussed prognosis of her condition  Since she has having intermittent in pain, we will start with conservative treatments of low impact activities like the use of a stationary bike  I discussed her and her  that if her pain gets worse, we can proceed with possible steroid injection or gel injections to help alleviate her pain  She can use Aspercreme or Voltaren gel to help alleviate her pain  I will see her back as needed  All patient's questions were answered to their satisfaction  This note is created using dictation transcription  It may contain typographical errors, grammatical errors, improperly dictated words, background noise and other errors  Other    Left knee pain    Relevant Orders    XR knee 1 or 2 vw left         Subjective:     Patient ID: Brenda Atkinson is a 80 y o  female  Chief Complaint:  Patient is a 80year old who presents to the office today for an initial evaluation of her left knee pain  She has early onset Alzheimer's and her  is here to speak for her  She states that her pain started a couple weeks ago with no known mechanism of injury  She was seen in the ED on 3/14/2021 for her left knee pain  She states that her pain is located over the anterior aspect of her left knee  She states that her pain is intermittent in and will be a dull achy pain  She states she will have pain when performing her activities of daily living  She will of pain she walks for a long period of time  She will use Mobic and Tylenol to help alleviate her pain  She states that her knee feels unstable    She denies any previous injury  She denies any numbness or tingling  Information on patient's intake form was reviewed  Allergy:  Allergies   Allergen Reactions    Ace Inhibitors Cough    Dust Mite Extract     Fexofenadine      [facility] [id number]: ZOMBIE    Lisinopril Other (See Comments)     Got very cold  chills    Mold Extract  [Trichophyton]     Pollen Extract     Padilla Grass Pollen Allergen     Tree Extract     Penicillins Rash and Hives     Medications:  all current active meds have been reviewed  Past Medical History:  Past Medical History:   Diagnosis Date    Degenerative joint disease (DJD) of hip     Hyperlipidemia     Hypertension     Osteopenia     Osteoporosis      Past Surgical History:  Past Surgical History:   Procedure Laterality Date    EYE SURGERY      HIP SURGERY      JOINT REPLACEMENT      right hip     Family History:  Family History   Problem Relation Age of Onset    Breast cancer Mother     Substance Abuse Neg Hx     Mental illness Neg Hx      Social History:  Social History     Substance and Sexual Activity   Alcohol Use Never    Frequency: Never    Binge frequency: Never    Comment: Per [employer]:  Occasional alcohol     Social History     Substance and Sexual Activity   Drug Use No     Social History     Tobacco Use   Smoking Status Never Smoker   Smokeless Tobacco Never Used     Review of Systems   Constitutional: Positive for activity change  HENT: Negative  Eyes: Negative  Respiratory: Negative  Cardiovascular: Negative  Gastrointestinal: Negative  Endocrine: Negative  Genitourinary: Negative  Musculoskeletal: Positive for arthralgias (Left knee) and joint swelling  Negative for gait problem  Skin: Negative  Allergic/Immunologic: Negative  Neurological: Negative  Hematological: Negative  Psychiatric/Behavioral: Negative            Objective:  BP Readings from Last 1 Encounters:   04/07/21 126/82      Wt Readings from Last 1 Encounters:   04/07/21 108 kg (237 lb)      BMI:   Estimated body mass index is 35 kg/m² as calculated from the following:    Height as of this encounter: 5' 9" (1 753 m)  Weight as of this encounter: 108 kg (237 lb)  BSA:   Estimated body surface area is 2 22 meters squared as calculated from the following:    Height as of this encounter: 5' 9" (1 753 m)  Weight as of this encounter: 108 kg (237 lb)  Physical Exam  Vitals signs and nursing note reviewed  Constitutional:       Appearance: Normal appearance  She is well-developed  HENT:      Head: Normocephalic and atraumatic  Right Ear: External ear normal       Left Ear: External ear normal    Eyes:      Extraocular Movements: Extraocular movements intact  Conjunctiva/sclera: Conjunctivae normal    Neck:      Musculoskeletal: Neck supple  Pulmonary:      Effort: Pulmonary effort is normal    Musculoskeletal:      Left knee: She exhibits no effusion  Skin:     General: Skin is warm and dry  Neurological:      General: No focal deficit present  Mental Status: She is alert  Deep Tendon Reflexes: Reflexes are normal and symmetric  Psychiatric:         Mood and Affect: Mood normal          Behavior: Behavior normal        Left Knee Exam     Tenderness   The patient is experiencing tenderness in the patella, lateral retinaculum and medial retinaculum  Range of Motion   Extension: 0   Flexion: 130     Tests   Evans:  Medial - negative Lateral - negative  Varus: negative Valgus: negative  Patellar apprehension: negative    Other   Erythema: absent  Scars: absent  Sensation: normal  Pulse: present  Swelling: none  Effusion: no effusion present    Comments:  No crepitus appreciated             I have personally reviewed pertinent films in PACS and my interpretation is as follows: X-rays taken today of her left knee demonstrates mild to moderate osteoarthritis  There is evidence of joint space narrowing and osteophytes    There is no acute fractures or dislocations appreciated       Scribe Attestation    I,:  Robert Rose am acting as a scribe while in the presence of the attending physician :       I,:  Rosemary Rm MD personally performed the services described in this documentation    as scribed in my presence :

## 2021-04-17 DIAGNOSIS — E78.00 HYPERCHOLESTEREMIA: ICD-10-CM

## 2021-04-19 RX ORDER — SIMVASTATIN 20 MG
TABLET ORAL
Qty: 90 TABLET | Refills: 3 | Status: SHIPPED | OUTPATIENT
Start: 2021-04-19 | End: 2022-06-22

## 2021-04-20 ENCOUNTER — HOSPITAL ENCOUNTER (OUTPATIENT)
Dept: MRI IMAGING | Facility: HOSPITAL | Age: 81
Discharge: HOME | End: 2021-04-20
Payer: MEDICARE

## 2021-04-20 DIAGNOSIS — R93.89 THICKENED ENDOMETRIUM: ICD-10-CM

## 2021-04-20 PROCEDURE — A9585 GADOBUTROL INJECTION: HCPCS | Performed by: RADIOLOGY

## 2021-04-20 PROCEDURE — 72197 MRI PELVIS W/O & W/DYE: CPT

## 2021-04-20 RX ADMIN — GADOBUTROL 10 ML: 604.72 INJECTION INTRAVENOUS at 07:02

## 2021-04-21 DIAGNOSIS — I10 ESSENTIAL HYPERTENSION: ICD-10-CM

## 2021-04-21 RX ORDER — VALSARTAN 160 MG/1
TABLET ORAL
Qty: 90 TABLET | Refills: 3 | Status: SHIPPED | OUTPATIENT
Start: 2021-04-21 | End: 2022-04-26

## 2021-09-13 ENCOUNTER — APPOINTMENT (EMERGENCY)
Dept: RADIOLOGY | Facility: HOSPITAL | Age: 81
End: 2021-09-13
Payer: MEDICARE

## 2021-09-13 ENCOUNTER — APPOINTMENT (EMERGENCY)
Dept: CT IMAGING | Facility: HOSPITAL | Age: 81
End: 2021-09-13
Payer: MEDICARE

## 2021-09-13 ENCOUNTER — HOSPITAL ENCOUNTER (EMERGENCY)
Facility: HOSPITAL | Age: 81
Discharge: HOME | End: 2021-09-13
Attending: EMERGENCY MEDICINE
Payer: MEDICARE

## 2021-09-13 VITALS
BODY MASS INDEX: 35 KG/M2 | WEIGHT: 237 LBS | RESPIRATION RATE: 18 BRPM | TEMPERATURE: 98.4 F | HEART RATE: 76 BPM | SYSTOLIC BLOOD PRESSURE: 165 MMHG | OXYGEN SATURATION: 92 % | DIASTOLIC BLOOD PRESSURE: 70 MMHG

## 2021-09-13 DIAGNOSIS — K76.89 HEPATIC CYST: ICD-10-CM

## 2021-09-13 DIAGNOSIS — R93.89 THICKENED ENDOMETRIUM: ICD-10-CM

## 2021-09-13 DIAGNOSIS — M54.9 BACK PAIN: Primary | ICD-10-CM

## 2021-09-13 DIAGNOSIS — N94.9 ADNEXAL CYST: ICD-10-CM

## 2021-09-13 LAB
ALBUMIN SERPL BCP-MCNC: 3.3 G/DL (ref 3.5–5)
ALP SERPL-CCNC: 73 U/L (ref 46–116)
ALT SERPL W P-5'-P-CCNC: 95 U/L (ref 12–78)
ANION GAP SERPL CALCULATED.3IONS-SCNC: 9 MMOL/L (ref 4–13)
AST SERPL W P-5'-P-CCNC: 138 U/L (ref 5–45)
ATRIAL RATE: 73 BPM
BASOPHILS # BLD AUTO: 0.01 THOUSANDS/ΜL (ref 0–0.1)
BASOPHILS NFR BLD AUTO: 0 % (ref 0–1)
BILIRUB SERPL-MCNC: 0.4 MG/DL (ref 0.2–1)
BILIRUB UR QL STRIP: NEGATIVE
BUN SERPL-MCNC: 18 MG/DL (ref 5–25)
CALCIUM ALBUM COR SERPL-MCNC: 8.9 MG/DL (ref 8.3–10.1)
CALCIUM SERPL-MCNC: 8.3 MG/DL (ref 8.3–10.1)
CHLORIDE SERPL-SCNC: 101 MMOL/L (ref 100–108)
CLARITY UR: CLEAR
CO2 SERPL-SCNC: 29 MMOL/L (ref 21–32)
COLOR UR: YELLOW
CREAT SERPL-MCNC: 0.87 MG/DL (ref 0.6–1.3)
EOSINOPHIL # BLD AUTO: 0.03 THOUSAND/ΜL (ref 0–0.61)
EOSINOPHIL NFR BLD AUTO: 1 % (ref 0–6)
ERYTHROCYTE [DISTWIDTH] IN BLOOD BY AUTOMATED COUNT: 12.9 % (ref 11.6–15.1)
GFR SERPL CREATININE-BSD FRML MDRD: 64 ML/MIN/1.73SQ M
GLUCOSE SERPL-MCNC: 114 MG/DL (ref 65–140)
GLUCOSE UR STRIP-MCNC: NEGATIVE MG/DL
HCT VFR BLD AUTO: 42.6 % (ref 34.8–46.1)
HGB BLD-MCNC: 13.6 G/DL (ref 11.5–15.4)
HGB UR QL STRIP.AUTO: NEGATIVE
IMM GRANULOCYTES # BLD AUTO: 0.02 THOUSAND/UL (ref 0–0.2)
IMM GRANULOCYTES NFR BLD AUTO: 1 % (ref 0–2)
KETONES UR STRIP-MCNC: NEGATIVE MG/DL
LEUKOCYTE ESTERASE UR QL STRIP: NEGATIVE
LIPASE SERPL-CCNC: 48 U/L (ref 73–393)
LYMPHOCYTES # BLD AUTO: 0.7 THOUSANDS/ΜL (ref 0.6–4.47)
LYMPHOCYTES NFR BLD AUTO: 20 % (ref 14–44)
MCH RBC QN AUTO: 30 PG (ref 26.8–34.3)
MCHC RBC AUTO-ENTMCNC: 31.9 G/DL (ref 31.4–37.4)
MCV RBC AUTO: 94 FL (ref 82–98)
MONOCYTES # BLD AUTO: 0.48 THOUSAND/ΜL (ref 0.17–1.22)
MONOCYTES NFR BLD AUTO: 14 % (ref 4–12)
NEUTROPHILS # BLD AUTO: 2.27 THOUSANDS/ΜL (ref 1.85–7.62)
NEUTS SEG NFR BLD AUTO: 64 % (ref 43–75)
NITRITE UR QL STRIP: NEGATIVE
NRBC BLD AUTO-RTO: 0 /100 WBCS
NT-PROBNP SERPL-MCNC: 132 PG/ML
P AXIS: 52 DEGREES
PH UR STRIP.AUTO: 6 [PH]
PLATELET # BLD AUTO: 129 THOUSANDS/UL (ref 149–390)
PMV BLD AUTO: 11.9 FL (ref 8.9–12.7)
POTASSIUM SERPL-SCNC: 4.2 MMOL/L (ref 3.5–5.3)
PR INTERVAL: 148 MS
PROT SERPL-MCNC: 6.6 G/DL (ref 6.4–8.2)
PROT UR STRIP-MCNC: NEGATIVE MG/DL
QRS AXIS: 19 DEGREES
QRSD INTERVAL: 80 MS
QT INTERVAL: 394 MS
QTC INTERVAL: 434 MS
RBC # BLD AUTO: 4.53 MILLION/UL (ref 3.81–5.12)
SARS-COV-2 RNA RESP QL NAA+PROBE: POSITIVE
SODIUM SERPL-SCNC: 139 MMOL/L (ref 136–145)
SP GR UR STRIP.AUTO: 1.01 (ref 1–1.03)
T WAVE AXIS: 25 DEGREES
TROPONIN I SERPL-MCNC: <0.02 NG/ML
UROBILINOGEN UR QL STRIP.AUTO: 0.2 E.U./DL
VENTRICULAR RATE: 73 BPM
WBC # BLD AUTO: 3.51 THOUSAND/UL (ref 4.31–10.16)

## 2021-09-13 PROCEDURE — 84484 ASSAY OF TROPONIN QUANT: CPT | Performed by: PHYSICIAN ASSISTANT

## 2021-09-13 PROCEDURE — 81003 URINALYSIS AUTO W/O SCOPE: CPT | Performed by: PHYSICIAN ASSISTANT

## 2021-09-13 PROCEDURE — 93010 ELECTROCARDIOGRAM REPORT: CPT | Performed by: INTERNAL MEDICINE

## 2021-09-13 PROCEDURE — 96376 TX/PRO/DX INJ SAME DRUG ADON: CPT

## 2021-09-13 PROCEDURE — 74177 CT ABD & PELVIS W/CONTRAST: CPT

## 2021-09-13 PROCEDURE — U0005 INFEC AGEN DETEC AMPLI PROBE: HCPCS | Performed by: PHYSICIAN ASSISTANT

## 2021-09-13 PROCEDURE — G1004 CDSM NDSC: HCPCS

## 2021-09-13 PROCEDURE — 83880 ASSAY OF NATRIURETIC PEPTIDE: CPT | Performed by: PHYSICIAN ASSISTANT

## 2021-09-13 PROCEDURE — 71045 X-RAY EXAM CHEST 1 VIEW: CPT

## 2021-09-13 PROCEDURE — 83690 ASSAY OF LIPASE: CPT | Performed by: PHYSICIAN ASSISTANT

## 2021-09-13 PROCEDURE — 99285 EMERGENCY DEPT VISIT HI MDM: CPT | Performed by: PHYSICIAN ASSISTANT

## 2021-09-13 PROCEDURE — 80053 COMPREHEN METABOLIC PANEL: CPT | Performed by: PHYSICIAN ASSISTANT

## 2021-09-13 PROCEDURE — 93005 ELECTROCARDIOGRAM TRACING: CPT

## 2021-09-13 PROCEDURE — 85025 COMPLETE CBC W/AUTO DIFF WBC: CPT | Performed by: PHYSICIAN ASSISTANT

## 2021-09-13 PROCEDURE — 99285 EMERGENCY DEPT VISIT HI MDM: CPT

## 2021-09-13 PROCEDURE — 96374 THER/PROPH/DIAG INJ IV PUSH: CPT

## 2021-09-13 PROCEDURE — U0003 INFECTIOUS AGENT DETECTION BY NUCLEIC ACID (DNA OR RNA); SEVERE ACUTE RESPIRATORY SYNDROME CORONAVIRUS 2 (SARS-COV-2) (CORONAVIRUS DISEASE [COVID-19]), AMPLIFIED PROBE TECHNIQUE, MAKING USE OF HIGH THROUGHPUT TECHNOLOGIES AS DESCRIBED BY CMS-2020-01-R: HCPCS | Performed by: PHYSICIAN ASSISTANT

## 2021-09-13 PROCEDURE — 36415 COLL VENOUS BLD VENIPUNCTURE: CPT | Performed by: PHYSICIAN ASSISTANT

## 2021-09-13 RX ORDER — METHOCARBAMOL 500 MG/1
500 TABLET, FILM COATED ORAL 3 TIMES DAILY
Qty: 20 TABLET | Refills: 0 | Status: SHIPPED | OUTPATIENT
Start: 2021-09-13

## 2021-09-13 RX ORDER — LIDOCAINE 50 MG/G
1 PATCH TOPICAL ONCE
Status: DISCONTINUED | OUTPATIENT
Start: 2021-09-13 | End: 2021-09-13 | Stop reason: HOSPADM

## 2021-09-13 RX ORDER — FENTANYL CITRATE 50 UG/ML
50 INJECTION, SOLUTION INTRAMUSCULAR; INTRAVENOUS ONCE
Status: COMPLETED | OUTPATIENT
Start: 2021-09-13 | End: 2021-09-13

## 2021-09-13 RX ADMIN — LIDOCAINE 1 PATCH: 50 PATCH TOPICAL at 14:33

## 2021-09-13 RX ADMIN — FENTANYL CITRATE 50 MCG: 50 INJECTION, SOLUTION INTRAMUSCULAR; INTRAVENOUS at 14:55

## 2021-09-13 RX ADMIN — IOHEXOL 100 ML: 350 INJECTION, SOLUTION INTRAVENOUS at 16:07

## 2021-09-13 RX ADMIN — FENTANYL CITRATE 50 MCG: 50 INJECTION, SOLUTION INTRAMUSCULAR; INTRAVENOUS at 17:29

## 2021-09-13 NOTE — ED PROVIDER NOTES
History  Chief Complaint   Patient presents with    Back Pain     pt has back pain,     Loss of Appetite     pt has loss appetite for past few days     Patient is a 81 y/o F with h/o dementia, HTN that presents to the ED with back pain and abdominal pain that started a couple days ago  Most of the history was obtained by sister in law due to history of dementia  She states patient started complaining of back pain and leg pain  She states her legs do appear more swollen than normal  She has not had a bowel movement for a couple days  No fevers  History provided by:  Patient and relative  Back Pain  Location:  Lumbar spine  Quality:  Aching  Radiates to:  Does not radiate  Pain severity:  Moderate  Onset quality:  Gradual  Duration:  3 days  Timing:  Constant  Progression:  Worsening  Chronicity:  New  Context: not falling, not lifting heavy objects, not recent illness, not recent injury and not twisting    Relieved by:  Nothing  Worsened by:  Sitting  Ineffective treatments: advil PM   Associated symptoms: leg pain and weakness    Associated symptoms: no abdominal pain, no abdominal swelling, no bladder incontinence, no bowel incontinence, no chest pain, no dysuria, no fever, no headaches and no numbness        Prior to Admission Medications   Prescriptions Last Dose Informant Patient Reported? Taking?    Apoaequorin (Prevagen) 10 MG CAPS   Yes No   Sig: Take 1 tablet by mouth   Multiple Vitamins-Minerals (PRESERVISION AREDS) CAPS   Yes No   Sig: Take 2 capsules by mouth daily   OMEGA-3 FATTY ACIDS PO   Yes No   Sig: Take by mouth   amLODIPine (NORVASC) 5 mg tablet   No No   Sig: Take 1 tablet (5 mg total) by mouth daily   calcium carbonate-vitamin D (OSCAL-D) 500 mg-200 units per tablet   Yes No   Sig: Take 2 tablets by mouth daily with breakfast   meloxicam (MOBIC) 7 5 mg tablet   No No   Sig: Take 1 tablet (7 5 mg total) by mouth daily   memantine (NAMENDA) 5 mg tablet   Yes No   Sig: Take 5 mg by mouth 2 (two) times a day   simvastatin (ZOCOR) 20 mg tablet   No No   Sig: TAKE 1 TABLET BY MOUTH EVERY DAY   sodium chloride (DEEJAY 128) 2 % hypertonic ophthalmic solution   Yes No   Si drop once    valsartan (DIOVAN) 160 mg tablet   No No   Sig: TAKE 1 TABLET BY MOUTH EVERY DAY      Facility-Administered Medications: None       Past Medical History:   Diagnosis Date    Degenerative joint disease (DJD) of hip     Hyperlipidemia     Hypertension     Osteopenia     Osteoporosis        Past Surgical History:   Procedure Laterality Date    EYE SURGERY      HIP SURGERY      JOINT REPLACEMENT      right hip       Family History   Problem Relation Age of Onset    Breast cancer Mother     Substance Abuse Neg Hx     Mental illness Neg Hx      I have reviewed and agree with the history as documented  E-Cigarette/Vaping    E-Cigarette Use Never User      E-Cigarette/Vaping Substances    Nicotine No     THC No     CBD No     Flavoring No     Other No     Unknown No      Social History     Tobacco Use    Smoking status: Never Smoker    Smokeless tobacco: Never Used   Vaping Use    Vaping Use: Never used   Substance Use Topics    Alcohol use: Never     Comment: Per [employer]:  Occasional alcohol    Drug use: No       Review of Systems   Unable to perform ROS: Dementia   Constitutional: Negative for chills and fever  Cardiovascular: Positive for leg swelling  Negative for chest pain  Gastrointestinal: Positive for constipation and nausea  Negative for abdominal pain, bowel incontinence, diarrhea and vomiting  Genitourinary: Negative for bladder incontinence and dysuria  Musculoskeletal: Positive for back pain  Skin: Negative for color change and rash  Neurological: Positive for weakness  Negative for dizziness, numbness and headaches  Psychiatric/Behavioral: Negative for confusion  Physical Exam  Physical Exam  Vitals and nursing note reviewed     Constitutional:       General: She is not in acute distress  Appearance: Normal appearance  She is well-developed, well-groomed and normal weight  She is not ill-appearing or diaphoretic  HENT:      Head: Normocephalic and atraumatic  Right Ear: External ear normal       Left Ear: External ear normal       Nose: Nose normal    Eyes:      Conjunctiva/sclera: Conjunctivae normal       Pupils: Pupils are equal    Cardiovascular:      Rate and Rhythm: Normal rate and regular rhythm  Heart sounds: Normal heart sounds  No murmur heard  Pulmonary:      Effort: Pulmonary effort is normal       Breath sounds: Normal breath sounds  No wheezing, rhonchi or rales  Abdominal:      General: Abdomen is flat  Bowel sounds are increased  Palpations: Abdomen is soft  Tenderness: There is no abdominal tenderness  Musculoskeletal:         General: Normal range of motion  Cervical back: Normal range of motion  Right lower le+ Edema present  Left lower le+ Edema present  Skin:     General: Skin is warm and dry  Coloration: Skin is not jaundiced or pale  Findings: No rash  Neurological:      General: No focal deficit present  Mental Status: She is alert and oriented to person, place, and time  Sensory: Sensation is intact  Motor: Motor function is intact  Psychiatric:         Mood and Affect: Mood normal          Behavior: Behavior is cooperative           Vital Signs  ED Triage Vitals [21 1210]   Temperature Pulse Respirations Blood Pressure SpO2   98 4 °F (36 9 °C) 79 18 149/80 94 %      Temp src Heart Rate Source Patient Position - Orthostatic VS BP Location FiO2 (%)   -- Monitor Sitting Right arm --      Pain Score       7           Vitals:    21 1210 21 1445 21 1615 21 1815   BP: 149/80 (!) 180/113 (!) 177/72 165/70   Pulse: 79 80 82 76   Patient Position - Orthostatic VS: Sitting Lying Lying Lying         Visual Acuity      ED Medications  Medications lidocaine (LIDODERM) 5 % patch 1 patch (1 patch Topical Medication Applied 9/13/21 1433)   fentanyl citrate (PF) 100 MCG/2ML 50 mcg (50 mcg Intravenous Given 9/13/21 1455)   iohexol (OMNIPAQUE) 350 MG/ML injection (SINGLE-DOSE) 100 mL (100 mL Intravenous Given 9/13/21 1607)   fentanyl citrate (PF) 100 MCG/2ML 50 mcg (50 mcg Intravenous Given 9/13/21 1729)       Diagnostic Studies  Results Reviewed     Procedure Component Value Units Date/Time    Novel Coronavirus (Covid-19),PCR [facility] - 2 Hour Stat [[id number]]  (Abnormal) Collected: 09/13/21 1814    Lab Status: Final result Specimen: Nares from Nose Updated: 09/13/21 1854     SARS-CoV-2 Positive    Narrative: The specimen collection materials, transport medium, and/or testing methodology utilized in the production of these test results have been proven to be reliable in a limited validation with an abbreviated program under the Emergency Utilization Authorization provided by the FDA  Testing reported as "Presumptive positive" will be confirmed with secondary testing to ensure result accuracy  Clinical caution and judgement should be used with the interpretation of these results with consideration of the clinical impression and other laboratory testing  Testing reported as "Positive" or "Negative" has been proven to be accurate according to standard laboratory validation requirements  All testing is performed with control materials showing appropriate reactivity at standard intervals        UA w Reflex to Microscopic w Reflex to Culture [[id number]] Collected: 09/13/21 1737    Lab Status: Final result Specimen: Urine, Clean Catch Updated: 09/13/21 1754     Color, UA Yellow     Clarity, UA Clear     Specific Gravity, UA 1 010     pH, UA 6 0     Leukocytes, UA Negative     Nitrite, UA Negative     Protein, UA Negative mg/dl      Glucose, UA Negative mg/dl      Ketones, UA Negative mg/dl      Urobilinogen, UA 0 2 E U /dl      Bilirubin, UA Negative     Blood, UA Negative    NT-BNP PRO [[id number]]  (Normal) Collected: 09/13/21 1427    Lab Status: Final result Specimen: Blood from Arm, Right Updated: 09/13/21 1505     NT-proBNP 132 pg/mL     Comprehensive metabolic panel [[id number]]  (Abnormal) Collected: 09/13/21 1427    Lab Status: Final result Specimen: Blood from Arm, Right Updated: 09/13/21 1504     Sodium 139 mmol/L      Potassium 4 2 mmol/L      Chloride 101 mmol/L      CO2 29 mmol/L      ANION GAP 9 mmol/L      BUN 18 mg/dL      Creatinine 0 87 mg/dL      Glucose 114 mg/dL      Calcium 8 3 mg/dL      Corrected Calcium 8 9 mg/dL       U/L      ALT 95 U/L      Alkaline Phosphatase 73 U/L      Total Protein 6 6 g/dL      Albumin 3 3 g/dL      Total Bilirubin 0 40 mg/dL      eGFR 64 ml/min/1 73sq m     Narrative:      [place] guidelines for Chronic Kidney Disease (CKD):     Stage 1 with normal or high GFR (GFR > 90 mL/min/1 73 square meters)    Stage 2 Mild CKD (GFR = 60-89 mL/min/1 73 square meters)    Stage 3A Moderate CKD (GFR = 45-59 mL/min/1 73 square meters)    Stage 3B Moderate CKD (GFR = 30-44 mL/min/1 73 square meters)    Stage 4 Severe CKD (GFR = 15-29 mL/min/1 73 square meters)    Stage 5 End Stage CKD (GFR <15 mL/min/1 73 square meters)  Note: GFR calculation is accurate only with a steady state creatinine    Lipase [[id number]]  (Abnormal) Collected: 09/13/21 1427    Lab Status: Final result Specimen: Blood from Arm, Right Updated: 09/13/21 1504     Lipase 48 u/L     Troponin I [[id number]]  (Normal) Collected: 09/13/21 1427    Lab Status: Final result Specimen: Blood from Arm, Right Updated: 09/13/21 1459     Troponin I <0 02 ng/mL     CBC and differential [[id number]]  (Abnormal) Collected: 09/13/21 1427    Lab Status: Final result Specimen: Blood from Arm, Right Updated: 09/13/21 1445     WBC 3 51 Thousand/uL      RBC 4 53 Million/uL      Hemoglobin 13 6 g/dL      Hematocrit 42 6 %      MCV 94 fL      MCH 30 0 pg MCHC 31 9 g/dL      RDW 12 9 %      MPV 11 9 fL      Platelets 783 Thousands/uL      nRBC 0 /100 WBCs      Neutrophils Relative 64 %      Immat GRANS % 1 %      Lymphocytes Relative 20 %      Monocytes Relative 14 %      Eosinophils Relative 1 %      Basophils Relative 0 %      Neutrophils Absolute 2 27 Thousands/µL      Immature Grans Absolute 0 02 Thousand/uL      Lymphocytes Absolute 0 70 Thousands/µL      Monocytes Absolute 0 48 Thousand/µL      Eosinophils Absolute 0 03 Thousand/µL      Basophils Absolute 0 01 Thousands/µL                  CT abdomen pelvis with contrast   Final Result by Keyonna Martinez MD (09/13 1703)      No acute findings to explain abdominal distention  Several chronic and nonemergent findings above  Workstation performed: [id number]         XR chest 1 view portable   Final Result by Jonnie Hollins MD (09/13 6785)   No acute cardiopulmonary disease  Findings are stable            Workstation performed: [id number]                    Procedures  ECG 12 Lead Documentation Only    Date/Time: 9/13/2021 1:58 PM  Performed by: Nery Johnson PA-C  Authorized by: Nery Johnson PA-C     Indications / Diagnosis:  Weakness  ECG reviewed by me, the ED Provider: yes    Patient location:  ED  Previous ECG:     Previous ECG:  Unavailable  Rate:     ECG rate:  73  Rhythm:     Rhythm: sinus rhythm    Conduction:     Conduction: normal    ST segments:     ST segments:  Normal  T waves:     T waves: normal               ED Course             2049:  I called patient and spoke with  and sister in law and informed them of positive covid and instructed them to quarantine for 2 weeks                                 MDM  Number of Diagnoses or Management Options  Adnexal cyst: established and worsening  Back pain: new and requires workup  Hepatic cyst: established and worsening  Thickened endometrium: established and worsening  Diagnosis management comments: Patient with back pain, nausea, constipation, will order labs, CT scan to r/o colitis, obstruction, UTI, kidney stone  Patient with worsening adnexal cyst and thickened endometrium, d/w patient the importance of f/u with OB/GYN to r/o cancer  Sister in law states they started a workup on this in April, but has not heard back from her doctor, but agrees to call her doctor tomorrow for further evaluation  Will prescribe muscle relaxant for back pain and refer to PCP for recheck  Amount and/or Complexity of Data Reviewed  Clinical lab tests: ordered and reviewed  Tests in the radiology section of CPT®: ordered and reviewed  Obtain history from someone other than the patient: yes    Patient Progress  Patient progress: stable      Disposition  Final diagnoses:   Back pain   Thickened endometrium   Adnexal cyst - bilateral   Hepatic cyst     Time reflects when diagnosis was documented in both MDM as applicable and the Disposition within this note     Time User Action Codes Description Comment    9/13/2021  5:13 PM Ivin Doctor Add [M54 9] Back pain     9/13/2021  5:13 PM Ivin Doctor Add [R93 89] Thickened endometrium     9/13/2021  5:14 PM Ivin Doctor Add [N94 9] Adnexal cyst     9/13/2021  5:14 PM Ivin Doctor Modify [N94 9] Adnexal cyst bilateral    9/13/2021  5:14 PM Ivin Doctor Add [K76 89] Hepatic cyst       ED Disposition     ED Disposition Condition Date/Time Comment    Discharge Stable Mon Sep 13, 2021  6:05 PM Baljit Gaveshasta discharge to home/self care  Follow-up Information     Follow up With Specialties Details Why Contact Info    Luis Cleaning MD Internal Medicine Call in 1 week For recheck of back pain [place]  [address]      OB/GYN  Call in 1 day concerning thickened endometrium and adnexal cysts             Discharge Medication List as of 9/13/2021  6:07 PM      START taking these medications    Details methocarbamol (ROBAXIN) 500 mg tablet Take 1 tablet (500 mg total) by mouth 3 (three) times a day, Starting Mon 9/13/2021, Normal         CONTINUE these medications which have NOT CHANGED    Details   amLODIPine (NORVASC) 5 mg tablet Take 1 tablet (5 mg total) by mouth daily, Starting Tue 12/29/2020, Normal      Apoaequorin (Prevagen) 10 MG CAPS Take 1 tablet by mouth, Starting Thu 1/14/2021, Historical Med      calcium carbonate-vitamin D (OSCAL-D) 500 mg-200 units per tablet Take 2 tablets by mouth daily with breakfast, Until Discontinued, Historical Med      meloxicam (MOBIC) 7 5 mg tablet Take 1 tablet (7 5 mg total) by mouth daily, Starting Thu 3/25/2021, Normal      memantine (NAMENDA) 5 mg tablet Take 5 mg by mouth 2 (two) times a day, Historical Med      Multiple Vitamins-Minerals (PRESERVISION AREDS) CAPS Take 2 capsules by mouth daily, Starting Tue 5/12/2015, Historical Med      OMEGA-3 FATTY ACIDS PO Take by mouth, Starting Thu 1/14/2021, Historical Med      simvastatin (ZOCOR) 20 mg tablet TAKE 1 TABLET BY MOUTH EVERY DAY, Normal      sodium chloride (DEEJAY 128) 2 % hypertonic ophthalmic solution 1 drop once , Historical Med      valsartan (DIOVAN) 160 mg tablet TAKE 1 TABLET BY MOUTH EVERY DAY, Normal           No discharge procedures on file      PDMP Review     None          ED Provider  Electronically Signed by           Nery Johnson PA-C  09/13/21 220 Haylie Dorantes PA-C  09/13/21 2050

## 2021-09-13 NOTE — DISCHARGE INSTRUCTIONS
Continue your current medications  Take robaxin 3 times a day for back pain  OVer the counter lidocaine patches  Follow up with family doctor this week for recheck  Follow up with OB/GYN concerning thickened endometrium, and ovarian cysts and liver cysts

## 2021-10-25 ENCOUNTER — OFFICE VISIT (OUTPATIENT)
Dept: FAMILY MEDICINE CLINIC | Facility: HOSPITAL | Age: 81
End: 2021-10-25
Payer: MEDICARE

## 2021-10-25 VITALS
SYSTOLIC BLOOD PRESSURE: 150 MMHG | BODY MASS INDEX: 34.96 KG/M2 | DIASTOLIC BLOOD PRESSURE: 70 MMHG | HEIGHT: 69 IN | WEIGHT: 236 LBS | HEART RATE: 78 BPM

## 2021-10-25 DIAGNOSIS — E78.00 PURE HYPERCHOLESTEROLEMIA: ICD-10-CM

## 2021-10-25 DIAGNOSIS — F02.80 EARLY ONSET ALZHEIMER'S DEMENTIA WITHOUT BEHAVIORAL DISTURBANCE (HCC): ICD-10-CM

## 2021-10-25 DIAGNOSIS — G30.0 EARLY ONSET ALZHEIMER'S DEMENTIA WITHOUT BEHAVIORAL DISTURBANCE (HCC): ICD-10-CM

## 2021-10-25 DIAGNOSIS — Z00.00 HEALTH CARE MAINTENANCE: Primary | ICD-10-CM

## 2021-10-25 DIAGNOSIS — Z13.29 SCREENING FOR THYROID DISORDER: ICD-10-CM

## 2021-10-25 DIAGNOSIS — I10 BENIGN ESSENTIAL HYPERTENSION: ICD-10-CM

## 2021-10-25 PROCEDURE — 1123F ACP DISCUSS/DSCN MKR DOCD: CPT | Performed by: INTERNAL MEDICINE

## 2021-10-25 PROCEDURE — G0439 PPPS, SUBSEQ VISIT: HCPCS | Performed by: INTERNAL MEDICINE

## 2021-11-22 ENCOUNTER — APPOINTMENT (OUTPATIENT)
Dept: LAB | Facility: HOSPITAL | Age: 81
End: 2021-11-22
Attending: INTERNAL MEDICINE
Payer: MEDICARE

## 2021-11-22 DIAGNOSIS — Z13.29 SCREENING FOR THYROID DISORDER: ICD-10-CM

## 2021-11-22 DIAGNOSIS — E78.00 PURE HYPERCHOLESTEROLEMIA: ICD-10-CM

## 2021-11-22 LAB
CHOLEST SERPL-MCNC: 126 MG/DL
HDLC SERPL-MCNC: 43 MG/DL
LDLC SERPL CALC-MCNC: 58 MG/DL (ref 0–100)
TRIGL SERPL-MCNC: 127 MG/DL
TSH SERPL DL<=0.05 MIU/L-ACNC: 1.09 UIU/ML (ref 0.36–3.74)

## 2021-11-22 PROCEDURE — 84443 ASSAY THYROID STIM HORMONE: CPT

## 2021-11-22 PROCEDURE — 36415 COLL VENOUS BLD VENIPUNCTURE: CPT

## 2021-11-22 PROCEDURE — 80061 LIPID PANEL: CPT

## 2021-12-24 DIAGNOSIS — I10 ESSENTIAL HYPERTENSION: ICD-10-CM

## 2021-12-25 RX ORDER — AMLODIPINE BESYLATE 5 MG/1
TABLET ORAL
Qty: 90 TABLET | Refills: 2 | Status: SHIPPED | OUTPATIENT
Start: 2021-12-25 | End: 2022-04-27

## 2022-04-04 ENCOUNTER — RA CDI HCC (OUTPATIENT)
Dept: OTHER | Facility: HOSPITAL | Age: 82
End: 2022-04-04

## 2022-04-04 NOTE — PROGRESS NOTES
[address]  coding opportunities       Chart reviewed, no opportunity found: CHART REVIEWED, NO OPPORTUNITY FOUND        Patients Insurance     Medicare Insurance: Medicare

## 2022-04-26 DIAGNOSIS — I10 ESSENTIAL HYPERTENSION: ICD-10-CM

## 2022-04-26 RX ORDER — VALSARTAN 160 MG/1
TABLET ORAL
Qty: 90 TABLET | Refills: 3 | Status: SHIPPED | OUTPATIENT
Start: 2022-04-26

## 2022-04-27 ENCOUNTER — OFFICE VISIT (OUTPATIENT)
Dept: FAMILY MEDICINE CLINIC | Facility: HOSPITAL | Age: 82
End: 2022-04-27
Payer: MEDICARE

## 2022-04-27 VITALS
WEIGHT: 243 LBS | DIASTOLIC BLOOD PRESSURE: 78 MMHG | HEIGHT: 69 IN | RESPIRATION RATE: 16 BRPM | BODY MASS INDEX: 35.99 KG/M2 | SYSTOLIC BLOOD PRESSURE: 180 MMHG | OXYGEN SATURATION: 96 % | HEART RATE: 75 BPM

## 2022-04-27 DIAGNOSIS — Z13.29 SCREENING FOR THYROID DISORDER: ICD-10-CM

## 2022-04-27 DIAGNOSIS — I10 BENIGN ESSENTIAL HYPERTENSION: ICD-10-CM

## 2022-04-27 DIAGNOSIS — D69.6 PLATELETS DECREASED (HCC): ICD-10-CM

## 2022-04-27 DIAGNOSIS — Z00.00 HEALTH CARE MAINTENANCE: ICD-10-CM

## 2022-04-27 DIAGNOSIS — E66.01 MORBID OBESITY (HCC): ICD-10-CM

## 2022-04-27 DIAGNOSIS — E78.00 PURE HYPERCHOLESTEROLEMIA: ICD-10-CM

## 2022-04-27 DIAGNOSIS — F02.80 EARLY ONSET ALZHEIMER'S DEMENTIA WITHOUT BEHAVIORAL DISTURBANCE (HCC): ICD-10-CM

## 2022-04-27 DIAGNOSIS — G30.0 EARLY ONSET ALZHEIMER'S DEMENTIA WITHOUT BEHAVIORAL DISTURBANCE (HCC): ICD-10-CM

## 2022-04-27 DIAGNOSIS — Z12.31 ENCOUNTER FOR SCREENING MAMMOGRAM FOR MALIGNANT NEOPLASM OF BREAST: Primary | ICD-10-CM

## 2022-04-27 DIAGNOSIS — Z13.0 SCREENING, ANEMIA, DEFICIENCY, IRON: ICD-10-CM

## 2022-04-27 PROCEDURE — 99214 OFFICE O/P EST MOD 30 MIN: CPT | Performed by: INTERNAL MEDICINE

## 2022-04-27 RX ORDER — AMLODIPINE BESYLATE 10 MG/1
10 TABLET ORAL DAILY
Qty: 30 TABLET | Refills: 5 | Status: SHIPPED | OUTPATIENT
Start: 2022-04-27 | End: 2022-05-27

## 2022-04-27 RX ORDER — MEMANTINE HYDROCHLORIDE 10 MG/1
10 TABLET ORAL 2 TIMES DAILY
COMMUNITY
Start: 2022-03-11

## 2022-04-27 RX ORDER — PREDNISOLONE ACETATE 10 MG/ML
SUSPENSION/ DROPS OPHTHALMIC
COMMUNITY
Start: 2022-03-14

## 2022-04-27 NOTE — PROGRESS NOTES
Subjective:   Chief Complaint   Patient presents with    Follow-up        Patient ID: Ammy Barlow is a [de-identified] y o  female  Follow up of chronic problems  No new concerns  Dementia is advanced   is caregiver  The following portions of the patient's history were reviewed and updated as appropriate: allergies, current medications, past family history, past medical history, past social history, past surgical history and problem list     Review of Systems   Constitutional: Negative for fatigue and fever  HENT: Negative for hearing loss  Eyes: Negative for visual disturbance  Respiratory: Negative for cough, chest tightness, shortness of breath and wheezing  Cardiovascular: Negative for chest pain, palpitations and leg swelling  Gastrointestinal: Negative for abdominal pain, diarrhea and nausea  Genitourinary: Negative for dysuria and hematuria  Musculoskeletal: Negative for arthralgias  Neurological: Negative for dizziness, numbness and headaches  Psychiatric/Behavioral: Negative for confusion and dysphoric mood  All other systems reviewed and are negative          Current Outpatient Medications on File Prior to Visit   Medication Sig Dispense Refill    amLODIPine (NORVASC) 5 mg tablet TAKE 1 TABLET BY MOUTH EVERY DAY 90 tablet 2    Apoaequorin (Prevagen) 10 MG CAPS Take 1 tablet by mouth      calcium carbonate-vitamin D (OSCAL-D) 500 mg-200 units per tablet Take 2 tablets by mouth daily with breakfast      meloxicam (MOBIC) 7 5 mg tablet Take 1 tablet (7 5 mg total) by mouth daily 30 tablet 5    memantine (NAMENDA) 10 mg tablet Take 10 mg by mouth 2 (two) times a day      methocarbamol (ROBAXIN) 500 mg tablet Take 1 tablet (500 mg total) by mouth 3 (three) times a day 20 tablet 0    Multiple Vitamins-Minerals (PRESERVISION AREDS) CAPS Take 2 capsules by mouth daily      OMEGA-3 FATTY ACIDS PO Take by mouth      prednisoLONE acetate (PRED FORTE) 1 % ophthalmic suspension instill ONE drop IN THE LEFT EYE THREE TIMES DAILY FOR 5 days      simvastatin (ZOCOR) 20 mg tablet TAKE 1 TABLET BY MOUTH EVERY DAY 90 tablet 3    sodium chloride (DEEJAY 128) 2 % hypertonic ophthalmic solution 1 drop once       valsartan (DIOVAN) 160 mg tablet TAKE 1 TABLET BY MOUTH EVERY DAY 90 tablet 3    memantine (NAMENDA) 5 mg tablet Take 5 mg by mouth 2 (two) times a day (Patient not taking: Reported on 4/27/2022 )       No current facility-administered medications on file prior to visit  Objective:  Vitals:    04/27/22 1524   BP: (!) 180/78   Pulse: 75   Resp: 16   SpO2: 96%   Weight: 110 kg (243 lb)   Height: 5' 9" (1 753 m)      Physical Exam  Vitals and nursing note reviewed  Constitutional:       General: She is not in acute distress  Cardiovascular:      Rate and Rhythm: Normal rate and regular rhythm  Pulmonary:      Effort: Pulmonary effort is normal       Breath sounds: Normal breath sounds  Musculoskeletal:         General: Normal range of motion  Cervical back: Normal range of motion  Skin:     Findings: No rash  Neurological:      Mental Status: She is alert and oriented to person, place, and time  Psychiatric:         Thought Content: Thought content normal          BMI Counseling: Body mass index is 35 88 kg/m²  The BMI is above normal  Nutrition recommendations include encouraging healthy choices of fruits and vegetables  Exercise recommendations include moderate physical activity 150 minutes/week  No pharmacotherapy was ordered  Rationale for BMI follow-up plan is due to patient being overweight or obese  Depression Screening and Follow-up Plan: Patient was screened for depression during today's encounter  They screened negative with a PHQ-2 score of 0  Falls Plan of Care: balance, strength, and gait training instructions were provided  Patient assessed for orthostatic hypotension  Assessed feet and footwear  Home safety education provided  Assessment/Plan:    No problem-specific Assessment & Plan notes found for this encounter  Diagnoses and all orders for this visit:    Encounter for screening mammogram for malignant neoplasm of breast    Health care maintenance    Pure hypercholesterolemia    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

## 2022-04-27 NOTE — PATIENT INSTRUCTIONS
You were seen for a follow up of chronic medical problems today  Be sure to make any changes to your medication as discussed by your doctor  If blood tests or other testing was ordered, be sure to obtain this at the time requested by the doctor  Our office will call you with the results of your tests once they arrive in our office  Amlodipine for blood pressure, increase to 10 mg daily  Can give 2 of 5 mg tablets until these are all used up

## 2022-06-22 DIAGNOSIS — E78.00 HYPERCHOLESTEREMIA: ICD-10-CM

## 2022-06-22 RX ORDER — SIMVASTATIN 20 MG
TABLET ORAL
Qty: 90 TABLET | Refills: 3 | Status: SHIPPED | OUTPATIENT
Start: 2022-06-22

## 2022-10-12 PROBLEM — Z00.00 HEALTH CARE MAINTENANCE: Status: RESOLVED | Noted: 2021-10-25 | Resolved: 2022-10-12

## 2022-10-28 ENCOUNTER — OFFICE VISIT (OUTPATIENT)
Dept: FAMILY MEDICINE CLINIC | Facility: HOSPITAL | Age: 82
End: 2022-10-28

## 2022-10-28 VITALS
BODY MASS INDEX: 35.99 KG/M2 | DIASTOLIC BLOOD PRESSURE: 80 MMHG | WEIGHT: 243 LBS | HEART RATE: 72 BPM | HEIGHT: 69 IN | OXYGEN SATURATION: 95 % | SYSTOLIC BLOOD PRESSURE: 160 MMHG

## 2022-10-28 DIAGNOSIS — I10 ESSENTIAL HYPERTENSION: ICD-10-CM

## 2022-10-28 DIAGNOSIS — E78.00 PURE HYPERCHOLESTEROLEMIA: ICD-10-CM

## 2022-10-28 DIAGNOSIS — I10 BENIGN ESSENTIAL HYPERTENSION: Primary | ICD-10-CM

## 2022-10-28 DIAGNOSIS — G30.0 EARLY ONSET ALZHEIMER'S DEMENTIA WITHOUT BEHAVIORAL DISTURBANCE (HCC): ICD-10-CM

## 2022-10-28 DIAGNOSIS — F02.80 EARLY ONSET ALZHEIMER'S DEMENTIA WITHOUT BEHAVIORAL DISTURBANCE (HCC): ICD-10-CM

## 2022-10-28 PROBLEM — E66.01 MORBID OBESITY (HCC): Status: RESOLVED | Noted: 2019-12-30 | Resolved: 2022-10-28

## 2022-10-28 RX ORDER — VALSARTAN 320 MG/1
320 TABLET ORAL DAILY
Qty: 90 TABLET | Refills: 2 | Status: SHIPPED | OUTPATIENT
Start: 2022-10-28

## 2022-10-28 NOTE — PATIENT INSTRUCTIONS
[facility]: [address] [facility]       Double up the Valsartan to 320 mg total together at lunch, until runs out & then give the new 320 mg single tablet then

## 2022-10-28 NOTE — PROGRESS NOTES
[address] [facility],     Assessment/Plan:      Diagnosis ICD-10-CM Associated Orders   1  Benign essential hypertension  I10       2  Early onset Alzheimer's dementia without behavioral disturbance ([facility] [address] )  G30 0 Ambulatory Referral to Neurology    F02 80 Ambulatory Referral to Senior Care      3  Pure hypercholesterolemia  E78 00       4  Essential hypertension  I10 valsartan (DIOVAN) 320 MG tablet      5  BMI 35 0-35 9,adult  Z68 35         • Meds reviewed & reconciled, will increase diovan to 320 from 160 mg    • Updated referrals given  Return in about 4 months (around 2/28/2023) for Annual Medicare Wellness  • Patient may call or return to office with any questions or concerns  ______________________________________________________________________  Subjective:     Patient ID: Ranjit Vaca is a [de-identified] y o  female  HPI  Ranjit Vaca  Chief Complaint   Patient presents with   • Follow-up   • Memory Loss     Per last note: "Dementia is advanced   is caregiver  Amlodipine for blood pressure, increase to 10 mg daily "     Seeing [facility] - Gabbi Osborn DO    Struggling with everything at home as she cannot help with tasks  The following portions of the patient's history were reviewed and updated as appropriate: allergies, current medications, past medical history, and problem list     Review of Systems   Constitutional: Negative for chills and fever  Respiratory: Negative for cough and shortness of breath  Cardiovascular: Negative for chest pain and palpitations  Neurological: Negative for weakness and light-headedness  Objective:      Vitals:    10/28/22 0958   BP: 160/80   Pulse: 72   SpO2: 95%      Physical Exam  Vitals reviewed  Constitutional:       General: She is not in acute distress  Appearance: Normal appearance  She is well-developed  She is obese  She is not ill-appearing  HENT:      Head: Normocephalic and atraumatic  Eyes:      General: No scleral icterus  Right eye: No discharge  Left eye: No discharge  Cardiovascular:      Rate and Rhythm: Normal rate and regular rhythm  Pulses: Normal pulses  Heart sounds: Normal heart sounds  No murmur heard  Pulmonary:      Effort: Pulmonary effort is normal  No respiratory distress  Breath sounds: Normal breath sounds  No stridor  No wheezing  Musculoskeletal:      Cervical back: Normal range of motion  Skin:     General: Skin is warm  Neurological:      Mental Status: She is alert  Gait: Gait normal    Psychiatric:         Mood and Affect: Mood normal          Behavior: Behavior normal          Judgment: Judgment normal       Comments: Pleasantly demented/confused  Portions of the record may have been created with voice recognition software  Occasional wrong word or "sound alike" substitutions may have occurred due to the inherent limitations of voice recognition software  Please review the chart carefully and recognize, using context, where substitutions/typographical errors may have occurred

## 2022-11-02 DIAGNOSIS — I10 BENIGN ESSENTIAL HYPERTENSION: ICD-10-CM

## 2022-11-02 RX ORDER — AMLODIPINE BESYLATE 10 MG/1
10 TABLET ORAL DAILY
Qty: 30 TABLET | Refills: 5 | Status: SHIPPED | OUTPATIENT
Start: 2022-11-02 | End: 2022-12-02

## 2023-02-24 ENCOUNTER — RA CDI HCC (OUTPATIENT)
Dept: OTHER | Facility: HOSPITAL | Age: 83
End: 2023-02-24

## 2023-03-03 ENCOUNTER — OFFICE VISIT (OUTPATIENT)
Dept: FAMILY MEDICINE CLINIC | Facility: HOSPITAL | Age: 83
End: 2023-03-03

## 2023-03-03 ENCOUNTER — APPOINTMENT (OUTPATIENT)
Dept: LAB | Facility: HOSPITAL | Age: 83
End: 2023-03-03
Attending: STUDENT IN AN ORGANIZED HEALTH CARE EDUCATION/TRAINING PROGRAM

## 2023-03-03 VITALS
HEIGHT: 69 IN | SYSTOLIC BLOOD PRESSURE: 181 MMHG | OXYGEN SATURATION: 94 % | BODY MASS INDEX: 35.84 KG/M2 | WEIGHT: 242 LBS | DIASTOLIC BLOOD PRESSURE: 81 MMHG | HEART RATE: 72 BPM

## 2023-03-03 DIAGNOSIS — E78.00 PURE HYPERCHOLESTEROLEMIA: ICD-10-CM

## 2023-03-03 DIAGNOSIS — N39.45 URINARY INCONTINENCE WITH CONTINUOUS LEAKAGE: ICD-10-CM

## 2023-03-03 DIAGNOSIS — I10 BENIGN ESSENTIAL HYPERTENSION: ICD-10-CM

## 2023-03-03 DIAGNOSIS — Z13.0 SCREENING FOR IRON DEFICIENCY ANEMIA: ICD-10-CM

## 2023-03-03 DIAGNOSIS — Z13.29 SCREENING FOR THYROID DISORDER: ICD-10-CM

## 2023-03-03 DIAGNOSIS — D69.6 PLATELETS DECREASED (HCC): ICD-10-CM

## 2023-03-03 DIAGNOSIS — G30.0 EARLY ONSET ALZHEIMER'S DEMENTIA WITHOUT BEHAVIORAL DISTURBANCE (HCC): Primary | ICD-10-CM

## 2023-03-03 DIAGNOSIS — F02.80 EARLY ONSET ALZHEIMER'S DEMENTIA WITHOUT BEHAVIORAL DISTURBANCE (HCC): Primary | ICD-10-CM

## 2023-03-03 LAB
ALBUMIN SERPL BCP-MCNC: 3.8 G/DL (ref 3.5–5)
ALP SERPL-CCNC: 71 U/L (ref 46–116)
ALT SERPL W P-5'-P-CCNC: 31 U/L (ref 12–78)
ANION GAP SERPL CALCULATED.3IONS-SCNC: 5 MMOL/L (ref 4–13)
AST SERPL W P-5'-P-CCNC: 36 U/L (ref 5–45)
BASOPHILS # BLD AUTO: 0.05 THOUSANDS/ÂΜL (ref 0–0.1)
BASOPHILS NFR BLD AUTO: 1 % (ref 0–1)
BILIRUB SERPL-MCNC: 0.72 MG/DL (ref 0.2–1)
BUN SERPL-MCNC: 20 MG/DL (ref 5–25)
CALCIUM SERPL-MCNC: 9.4 MG/DL (ref 8.3–10.1)
CHLORIDE SERPL-SCNC: 108 MMOL/L (ref 96–108)
CHOLEST SERPL-MCNC: 118 MG/DL
CO2 SERPL-SCNC: 28 MMOL/L (ref 21–32)
CREAT SERPL-MCNC: 0.85 MG/DL (ref 0.6–1.3)
EOSINOPHIL # BLD AUTO: 0.2 THOUSAND/ÂΜL (ref 0–0.61)
EOSINOPHIL NFR BLD AUTO: 3 % (ref 0–6)
ERYTHROCYTE [DISTWIDTH] IN BLOOD BY AUTOMATED COUNT: 12.7 % (ref 11.6–15.1)
GFR SERPL CREATININE-BSD FRML MDRD: 64 ML/MIN/1.73SQ M
GLUCOSE P FAST SERPL-MCNC: 123 MG/DL (ref 65–99)
HCT VFR BLD AUTO: 44.4 % (ref 34.8–46.1)
HDLC SERPL-MCNC: 50 MG/DL
HGB BLD-MCNC: 14.3 G/DL (ref 11.5–15.4)
IMM GRANULOCYTES # BLD AUTO: 0.04 THOUSAND/UL (ref 0–0.2)
IMM GRANULOCYTES NFR BLD AUTO: 1 % (ref 0–2)
LDLC SERPL CALC-MCNC: 47 MG/DL (ref 0–100)
LYMPHOCYTES # BLD AUTO: 1.68 THOUSANDS/ÂΜL (ref 0.6–4.47)
LYMPHOCYTES NFR BLD AUTO: 25 % (ref 14–44)
MCH RBC QN AUTO: 30.4 PG (ref 26.8–34.3)
MCHC RBC AUTO-ENTMCNC: 32.2 G/DL (ref 31.4–37.4)
MCV RBC AUTO: 94 FL (ref 82–98)
MONOCYTES # BLD AUTO: 0.58 THOUSAND/ÂΜL (ref 0.17–1.22)
MONOCYTES NFR BLD AUTO: 9 % (ref 4–12)
NEUTROPHILS # BLD AUTO: 4.21 THOUSANDS/ÂΜL (ref 1.85–7.62)
NEUTS SEG NFR BLD AUTO: 61 % (ref 43–75)
NONHDLC SERPL-MCNC: 68 MG/DL
NRBC BLD AUTO-RTO: 0 /100 WBCS
PLATELET # BLD AUTO: 177 THOUSANDS/UL (ref 149–390)
PMV BLD AUTO: 13.2 FL (ref 8.9–12.7)
POTASSIUM SERPL-SCNC: 4.4 MMOL/L (ref 3.5–5.3)
PROT SERPL-MCNC: 7.1 G/DL (ref 6.4–8.4)
RBC # BLD AUTO: 4.71 MILLION/UL (ref 3.81–5.12)
SODIUM SERPL-SCNC: 141 MMOL/L (ref 135–147)
TRIGL SERPL-MCNC: 106 MG/DL
TSH SERPL DL<=0.05 MIU/L-ACNC: 0.78 UIU/ML (ref 0.45–4.5)
WBC # BLD AUTO: 6.76 THOUSAND/UL (ref 4.31–10.16)

## 2023-03-03 RX ORDER — INCONTINENCE PAD,LINER,DISP
EACH MISCELLANEOUS 4 TIMES DAILY
Qty: 64 EACH | Refills: 3 | Status: SHIPPED | OUTPATIENT
Start: 2023-03-03

## 2023-03-03 NOTE — PATIENT INSTRUCTIONS
Medicare Preventive Visit Patient Instructions  Thank you for completing your Welcome to Medicare Visit or Medicare Annual Wellness Visit today  Your next wellness visit will be due in one year (3/3/2024)  The screening/preventive services that you may require over the next 5-10 years are detailed below  Some tests may not apply to you based off risk factors and/or age  Screening tests ordered at today's visit but not completed yet may show as past due  Also, please note that scanned in results may not display below  Preventive Screenings:  Service Recommendations Previous Testing/Comments   Colorectal Cancer Screening  * Colonoscopy    * Fecal Occult Blood Test (FOBT)/Fecal Immunochemical Test (FIT)  * Fecal DNA/Cologuard Test  * Flexible Sigmoidoscopy Age: 39-70 years old   Colonoscopy: every 10 years (may be performed more frequently if at higher risk)  OR  FOBT/FIT: every 1 year  OR  Cologuard: every 3 years  OR  Sigmoidoscopy: every 5 years  Screening may be recommended earlier than age 39 if at higher risk for colorectal cancer  Also, an individualized decision between you and your healthcare provider will decide whether screening between the ages of 74-80 would be appropriate  Colonoscopy: 11/07/2013  FOBT/FIT: Not on file  Cologuard: Not on file  Sigmoidoscopy: Not on file          Breast Cancer Screening Age: 36 years old  Frequency: every 1-2 years  Not required if history of left and right mastectomy Mammogram: 09/06/2018        Cervical Cancer Screening Between the ages of 21-29, pap smear recommended once every 3 years  Between the ages of 33-67, can perform pap smear with HPV co-testing every 5 years     Recommendations may differ for women with a history of total hysterectomy, cervical cancer, or abnormal pap smears in past  Pap Smear: Not on file    Screening Not Indicated   Hepatitis C Screening Once for adults born between [facility]  More frequently in patients at high risk for Hepatitis C Hep C Antibody: Not on file        Diabetes Screening 1-2 times per year if you're at risk for diabetes or have pre-diabetes Fasting glucose: 107 mg/dL (8/30/2019)  A1C: No results in last 5 years (No results in last 5 years)      Cholesterol Screening Once every 5 years if you don't have a lipid disorder  May order more often based on risk factors  Lipid panel: 11/22/2021    Screening Not Indicated  History Lipid Disorder     Other Preventive Screenings Covered by Medicare:  1  Abdominal Aortic Aneurysm (AAA) Screening: covered once if your at risk  You're considered to be at risk if you have a family history of AAA  2  Lung Cancer Screening: covers low dose CT scan once per year if you meet all of the following conditions: (1) Age 50-69; (2) No signs or symptoms of lung cancer; (3) Current smoker or have quit smoking within the last 15 years; (4) You have a tobacco smoking history of at least 20 pack years (packs per day multiplied by number of years you smoked); (5) You get a written order from a healthcare provider  3  Glaucoma Screening: covered annually if you're considered high risk: (1) You have diabetes OR (2) Family history of glaucoma OR (3)  aged 48 and older OR (3)  American aged 72 and older  3  Osteoporosis Screening: covered every 2 years if you meet one of the following conditions: (1) You're estrogen deficient and at risk for osteoporosis based off medical history and other findings; (2) Have a vertebral abnormality; (3) On glucocorticoid therapy for more than 3 months; (4) Have primary hyperparathyroidism; (5) On osteoporosis medications and need to assess response to drug therapy  · Last bone density test (DXA Scan): 06/30/2017  5  HIV Screening: covered annually if you're between the age of 12-76  Also covered annually if you are younger than 13 and older than 72 with risk factors for HIV infection   For pregnant patients, it is covered up to 3 times per pregnancy  Immunizations:  Immunization Recommendations   Influenza Vaccine Annual influenza vaccination during flu season is recommended for all persons aged >= 6 months who do not have contraindications   Pneumococcal Vaccine   * Pneumococcal conjugate vaccine = PCV13 (Prevnar 13), PCV15 (Vaxneuvance), PCV20 (Prevnar 20)  * Pneumococcal polysaccharide vaccine = PPSV23 (Pneumovax) Adults 25-60 years old: 1-3 doses may be recommended based on certain risk factors  Adults 72 years old: 1-2 doses may be recommended based off what pneumonia vaccine you previously received   Hepatitis B Vaccine 3 dose series if at intermediate or high risk (ex: diabetes, end stage renal disease, liver disease)   Tetanus (Td) Vaccine - COST NOT COVERED BY MEDICARE PART B Following completion of primary series, a booster dose should be given every 10 years to maintain immunity against tetanus  Td may also be given as tetanus wound prophylaxis  Tdap Vaccine - COST NOT COVERED BY MEDICARE PART B Recommended at least once for all adults  For pregnant patients, recommended with each pregnancy  Shingles Vaccine (Shingrix) - COST NOT COVERED BY MEDICARE PART B  2 shot series recommended in those aged 48 and above     Health Maintenance Due:      Topic Date Due   • Breast Cancer Screening: Mammogram  04/27/2023 (Originally 9/6/2019)     Immunizations Due:  There are no preventive care reminders to display for this patient  Advance Directives   What are advance directives? Advance directives are legal documents that state your wishes and plans for medical care  These plans are made ahead of time in case you lose your ability to make decisions for yourself  Advance directives can apply to any medical decision, such as the treatments you want, and if you want to donate organs  What are the types of advance directives? There are many types of advance directives, and each state has rules about how to use them   You may choose a combination of any of the following:  · Living will: This is a written record of the treatment you want  You can also choose which treatments you do not want, which to limit, and which to stop at a certain time  This includes surgery, medicine, IV fluid, and tube feedings  · Durable power of  for healthcare [facility]): This is a written record that states who you want to make healthcare choices for you when you are unable to make them for yourself  This person, called a proxy, is usually a family member or a friend  You may choose more than 1 proxy  · Do not resuscitate (DNR) order:  A DNR order is used in case your heart stops beating or you stop breathing  It is a request not to have certain forms of treatment, such as CPR  A DNR order may be included in other types of advance directives  · Medical directive: This covers the care that you want if you are in a coma, near death, or unable to make decisions for yourself  You can list the treatments you want for each condition  Treatment may include pain medicine, surgery, blood transfusions, dialysis, IV or tube feedings, and a ventilator (breathing machine)  · Values history: This document has questions about your views, beliefs, and how you feel and think about life  This information can help others choose the care that you would choose  Why are advance directives important? An advance directive helps you control your care  Although spoken wishes may be used, it is better to have your wishes written down  Spoken wishes can be misunderstood, or not followed  Treatments may be given even if you do not want them  An advance directive may make it easier for your family to make difficult choices about your care  Urinary Incontinence   Urinary incontinence (UI)  is when you lose control of your bladder  UI develops because your bladder cannot store or empty urine properly   The 3 most common types of UI are stress incontinence, urge incontinence, or both   Medicines:   · May be given to help strengthen your bladder control  Report any side effects of medication to your healthcare provider  Do pelvic muscle exercises often:  Your pelvic muscles help you stop urinating  Squeeze these muscles tight for 5 seconds, then relax for 5 seconds  Gradually work up to squeezing for 10 seconds  Do 3 sets of 15 repetitions a day, or as directed  This will help strengthen your pelvic muscles and improve bladder control  Train your bladder:  Go to the bathroom at set times, such as every 2 hours, even if you do not feel the urge to go  You can also try to hold your urine when you feel the urge to go  For example, hold your urine for 5 minutes when you feel the urge to go  As that becomes easier, hold your urine for 10 minutes  Self-care:   · Keep a UI record  Write down how often you leak urine and how much you leak  Make a note of what you were doing when you leaked urine  · Drink liquids as directed  You may need to limit the amount of liquid you drink to help control your urine leakage  Do not drink any liquid right before you go to bed  Limit or do not have drinks that contain caffeine or alcohol  · Prevent constipation  Eat a variety of high-fiber foods  Good examples are high-fiber cereals, beans, vegetables, and whole-grain breads  Walking is the best way to trigger your intestines to have a bowel movement  · Exercise regularly and maintain a healthy weight  Weight loss and exercise will decrease pressure on your bladder and help you control your leakage  · Use a catheter as directed  to help empty your bladder  A catheter is a tiny, plastic tube that is put into your bladder to drain your urine  · Go to behavior therapy as directed  Behavior therapy may be used to help you learn to control your urge to urinate      Weight Management   Why it is important to manage your weight:  Being overweight increases your risk of health conditions such as heart disease, high blood pressure, type 2 diabetes, and certain types of cancer  It can also increase your risk for osteoarthritis, sleep apnea, and other respiratory problems  Aim for a slow, steady weight loss  Even a small amount of weight loss can lower your risk of health problems  How to lose weight safely:  A safe and healthy way to lose weight is to eat fewer calories and get regular exercise  You can lose up about 1 pound a week by decreasing the number of calories you eat by 500 calories each day  Healthy meal plan for weight management:  A healthy meal plan includes a variety of foods, contains fewer calories, and helps you stay healthy  A healthy meal plan includes the following:  · Eat whole-grain foods more often  A healthy meal plan should contain fiber  Fiber is the part of grains, fruits, and vegetables that is not broken down by your body  Whole-grain foods are healthy and provide extra fiber in your diet  Some examples of whole-grain foods are whole-wheat breads and pastas, oatmeal, brown rice, and bulgur  · Eat a variety of vegetables every day  Include dark, leafy greens such as spinach, kale, maxim greens, and mustard greens  Eat yellow and orange vegetables such as carrots, sweet potatoes, and winter squash  · Eat a variety of fruits every day  Choose fresh or canned fruit (canned in its own juice or light syrup) instead of juice  Fruit juice has very little or no fiber  · Eat low-fat dairy foods  Drink fat-free (skim) milk or 1% milk  Eat fat-free yogurt and low-fat cottage cheese  Try low-fat cheeses such as mozzarella and other reduced-fat cheeses  · Choose meat and other protein foods that are low in fat  Choose beans or other legumes such as split peas or lentils  Choose fish, skinless poultry (chicken or turkey), or lean cuts of red meat (beef or pork)  Before you cook meat or poultry, cut off any visible fat  · Use less fat and oil  Try baking foods instead of frying them  Add less fat, such as margarine, sour cream, regular salad dressing and mayonnaise to foods  Eat fewer high-fat foods  Some examples of high-fat foods include french fries, doughnuts, ice cream, and cakes  · Eat fewer sweets  Limit foods and drinks that are high in sugar  This includes candy, cookies, regular soda, and sweetened drinks  Exercise:  Exercise at least 30 minutes per day on most days of the week  Some examples of exercise include walking, biking, dancing, and swimming  You can also fit in more physical activity by taking the stairs instead of the elevator or parking farther away from stores  Ask your healthcare provider about the best exercise plan for you  © Copyright [address] 2018 Information is for End User's use only and may not be sold, redistributed or otherwise used for commercial purposes   All illustrations and images included in CareNotes® are the copyrighted property of [employer]  or [address] [place] [address]

## 2023-03-03 NOTE — PROGRESS NOTES
Assessment and Plan:      Diagnosis ICD-10-CM Associated Orders   1  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)  G30 0     F02 80       2  Benign essential hypertension  I10 Comprehensive metabolic panel      3  BMI 35 0-35 9,adult  Z68 35       4  Pure hypercholesterolemia  E78 00 Lipid panel      5  Urinary incontinence with continuous leakage  N39 45 Incontinence Supply Disposable (Depend Adjustable Underwear Lg) MISC      6  Platelets decreased ([address] )  D69 6       7  Screening for thyroid disorder  Z13 29 TSH, 3rd generation      8  Screening for iron deficiency anemia  Z13 0 CBC and differential        Screening & diagnostic bloodwork ordered, our office will reach out with results once obtained  Consider social work consult  Try to get depends covered - will order other supplies as well  Does not need commode or beds  Stuff on all one floor  BMI Counseling: Body mass index is 35 74 kg/m²  The BMI is above normal  Nutrition recommendations include encouraging healthy choices of fruits and vegetables  Exercise recommendations include exercising 3-5 times per week and strength training exercises  Rationale for BMI follow-up plan is due to patient being overweight or obese  Depression Screening and Follow-up Plan: Patient was screened for depression during today's encounter  They screened negative with a PHQ-2 score of 0  Urinary Incontinence Plan of Care: counseling topics discussed: limit alcohol, caffeine, spicy foods, and acidic foods and limiting fluid intake 3-4 hours before bed  Pt uses diapers is difficult to get up to go          Preventive health issues were discussed with patient, and age appropriate screening tests were ordered as noted in patient's After Visit Summary  Personalized health advice and appropriate referrals for health education or preventive services given if needed, as noted in patient's After Visit Summary       History of Present Illness: Patient presents for a Medicare Wellness Visit    HPI   Does complain about her back hurting at times  Usually when up & walking  Home with  - all day, no support  Eating well, no concerns  2-3x a day  Wears depends, some difficulty with wiping, but has BM on toilet  Foul Odor in urine  Doesn't drink any water  Wt Readings from Last 3 Encounters:   03/03/23 110 kg (242 lb)   10/28/22 110 kg (243 lb)   04/27/22 110 kg (243 lb)      Patient Care Team:  Clearance Nicely, DO as PCP - General (Family Medicine)  Nicolette Simmonds, MD     Review of Systems:     Review of Systems   Unable to perform ROS: Dementia   Constitutional: Negative for chills and fever  Respiratory: Positive for cough (clear her throat in am)  Negative for shortness of breath  Cardiovascular: Negative for chest pain  Musculoskeletal: Positive for back pain and gait problem (walker for inside home walks during the day)  Negative for arthralgias  Neurological: Negative for syncope and headaches        Problem List:     Patient Active Problem List   Diagnosis   • Benign essential hypertension   • Hyperlipidemia   • 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)   • Macular degeneration   • Left knee pain   • Post-menopausal bleeding   • Primary osteoarthritis of left knee   • Platelets decreased (HCC)   • BMI 35 0-35 9,adult      Past Medical and Surgical History:     Past Medical History:   Diagnosis Date   • Degenerative joint disease (DJD) of hip    • Hyperlipidemia    • Osteopenia    • Osteoporosis      Past Surgical History:   Procedure Laterality Date   • EYE SURGERY     • HIP SURGERY     • JOINT REPLACEMENT      right hip      Family History:     Family History   Problem Relation Age of Onset   • Breast cancer Mother    • Substance Abuse Neg Hx    • Mental illness Neg Hx       Social History:     Social History     Socioeconomic History   • Marital status: /Civil Union     Spouse name: None   • Number of children: None   • Years of education: None   • Highest education level: None   Occupational History   • None   Tobacco Use   • Smoking status: Never   • Smokeless tobacco: Never   Vaping Use   • Vaping Use: Never used   Substance and Sexual Activity   • Alcohol use: Never     Comment: Per [employer]:  Occasional alcohol   • Drug use: No   • Sexual activity: Not Currently   Other Topics Concern   • None   Social History Narrative    Wears seatbelt    Active advance directive    Denied:  History of caffeine use    Good dental hygiene    Lives with spouse    Living situation:  Supportive and safe    Spouse/family support     Social Determinants of Health     Financial Resource Strain: Low Risk    • Difficulty of Paying Living Expenses: Not very hard   Food Insecurity: Not on file   Transportation Needs: No Transportation Needs   • Lack of Transportation (Medical): No   • Lack of Transportation (Non-Medical):  No   Physical Activity: Not on file   Stress: Not on file   Social Connections: Not on file   Intimate Partner Violence: Not on file   Housing Stability: Not on file      Medications and Allergies:     Current Outpatient Medications   Medication Sig Dispense Refill   • amLODIPine (NORVASC) 10 mg tablet Take 1 tablet (10 mg total) by mouth daily 30 tablet 5   • Apoaequorin (Prevagen) 10 MG CAPS Take 1 tablet by mouth     • calcium carbonate-vitamin D (OSCAL-D) 500 mg-200 units per tablet Take 2 tablets by mouth daily with breakfast     • Incontinence Supply Disposable (Depend Adjustable Underwear Lg) MISC Use 4 (four) times a day 64 each 3   • memantine (NAMENDA) 10 mg tablet Take 10 mg by mouth 2 (two) times a day     • Multiple Vitamins-Minerals (PRESERVISION AREDS) CAPS Take 2 capsules by mouth daily     • OMEGA-3 FATTY ACIDS PO Take by mouth     • simvastatin (ZOCOR) 20 mg tablet TAKE 1 TABLET BY MOUTH EVERY DAY 90 tablet 3   • sodium chloride (DEEJAY 128) 2 % hypertonic ophthalmic solution 1 drop once      • valsartan (DIOVAN) 320 MG tablet Take 1 tablet (320 mg total) by mouth daily 90 tablet 2     No current facility-administered medications for this visit  Allergies   Allergen Reactions   • Ace Inhibitors Cough   • Dust Mite Extract    • Fexofenadine      Annotation - [id number]: ZOMBIE   • Lisinopril Other (See Comments)     Got very cold  chills   • Mold Extract  [Trichophyton]    • Pollen Extract    • Padilla Grass Pollen Allergen    • Tree Extract    • Penicillins Rash and Hives      Immunizations:     Immunization History   Administered Date(s) Administered   • Influenza, seasonal, injectable 12/01/2011   • Pneumococcal Conjugate 13-Valent 04/18/2018   • Pneumococcal Polysaccharide PPV23 11/16/2010   • Tdap 12/18/2012      Health Maintenance:         Topic Date Due   • Breast Cancer Screening: Mammogram  04/27/2023 (Originally 9/6/2019)     There are no preventive care reminders to display for this patient  Medicare Screening Tests and Risk Assessments:     Lamonte Dickinson is here for her Subsequent Wellness visit  Health Risk Assessment:   Patient rates overall health as good  Patient feels that their physical health rating is same  Patient is very satisfied with their life  Eyesight was rated as same  Hearing was rated as same  Patient feels that their emotional and mental health rating is same  Patients states they are never, rarely angry  Patient states they are always unusually tired/fatigued  Pain experienced in the last 7 days has been none  Patient states that she has experienced no weight loss or gain in last 6 months  Fall Risk Screening: In the past year, patient has experienced: no history of falling in past year      Urinary Incontinence Screening:   Patient has leaked urine accidently in the last six months  Home Safety:  Patient has trouble with stairs inside or outside of their home  Patient has working smoke alarms Home safety hazards include: loose rugs on the floor  Nutrition:   Current diet is Regular  Medications:   Patient is currently taking over-the-counter supplements  OTC medications include: see medication list  Patient is not able to manage medications  Activities of Daily Living (ADLs)/Instrumental Activities of Daily Living (IADLs):   Walk and transfer into and out of bed and chair?: Yes  Dress and groom yourself?: Yes    Bathe or shower yourself?: No    Feed yourself? Yes  Do your laundry/housekeeping?: No  Manage your money, pay your bills and track your expenses?: No  Make your own meals?: No    Do your own shopping?: No    Previous Hospitalizations:   Any hospitalizations or ED visits within the last 12 months?: No      PREVENTIVE SCREENINGS      Cardiovascular Screening:    General: Screening Not Indicated and History Lipid Disorder      Cervical Cancer Screening:    General: Screening Not Indicated      Lung Cancer Screening:     General: Screening Not Indicated    Screening, Brief Intervention, and Referral to Treatment (SBIRT)    Screening      AUDIT-C Screenin) How often did you have a drink containing alcohol in the past year? never  2) How many drinks did you have on a typical day when you were drinking in the past year? 0  3) How often did you have 6 or more drinks on one occasion in the past year? never    AUDIT-C Score: 0  Interpretation: Score 0-2 (female): Negative screen for alcohol misuse    Single Item Drug Screening:  How often have you used an illegal drug (including marijuana) or a prescription medication for non-medical reasons in the past year? never    Single Item Drug Screen Score: 0  Interpretation: Negative screen for possible drug use disorder     Physical Exam:     BP (!) 181/81   Pulse 72   Ht 5' 9" (1 753 m)   Wt 110 kg (242 lb)   LMP  (LMP Unknown)   SpO2 94%   BMI 35 74 kg/m²     Physical Exam  Vitals reviewed  Constitutional:       General: She is not in acute distress  Appearance: Normal appearance   She is obese  She is not ill-appearing  HENT:      Head: Normocephalic and atraumatic  Right Ear: External ear normal       Left Ear: External ear normal       Nose: Nose normal  No congestion or rhinorrhea  Mouth/Throat:      Mouth: Mucous membranes are moist       Pharynx: Oropharynx is clear  No oropharyngeal exudate or posterior oropharyngeal erythema  Eyes:      General: No scleral icterus  Right eye: No discharge  Left eye: No discharge  Conjunctiva/sclera: Conjunctivae normal    Cardiovascular:      Rate and Rhythm: Normal rate and regular rhythm  Pulses: Normal pulses  Heart sounds: Normal heart sounds  No murmur heard  No friction rub  No gallop  Pulmonary:      Effort: Pulmonary effort is normal  No respiratory distress  Breath sounds: Normal breath sounds  No stridor  No wheezing  Musculoskeletal:         General: Normal range of motion  Cervical back: Normal range of motion and neck supple  No rigidity  Right lower leg: Edema (2+) present  Left lower leg: Edema (2+) present  Lymphadenopathy:      Cervical: No cervical adenopathy  Skin:     General: Skin is warm and dry  Capillary Refill: Capillary refill takes less than 2 seconds  Coloration: Skin is not jaundiced or pale  Neurological:      Mental Status: She is alert  Gait: Gait normal    Psychiatric:         Mood and Affect: Mood normal          Behavior: Behavior normal          Judgment: Judgment normal       Comments: Pleasantly demented  Unable to answer questions for herself             Zak Voss,

## 2023-04-24 DIAGNOSIS — I10 ESSENTIAL HYPERTENSION: ICD-10-CM

## 2023-04-24 RX ORDER — VALSARTAN 320 MG/1
320 TABLET ORAL DAILY
Qty: 90 TABLET | Refills: 2 | Status: SHIPPED | OUTPATIENT
Start: 2023-04-24

## 2023-06-20 ENCOUNTER — HOSPITAL ENCOUNTER (EMERGENCY)
Facility: HOSPITAL | Age: 83
Discharge: HOME | End: 2023-06-20
Attending: EMERGENCY MEDICINE
Payer: MEDICARE

## 2023-06-20 VITALS
OXYGEN SATURATION: 95 % | SYSTOLIC BLOOD PRESSURE: 160 MMHG | TEMPERATURE: 97.9 F | HEIGHT: 69 IN | DIASTOLIC BLOOD PRESSURE: 65 MMHG | HEART RATE: 63 BPM | RESPIRATION RATE: 16 BRPM | BODY MASS INDEX: 35.84 KG/M2 | WEIGHT: 242 LBS

## 2023-06-20 DIAGNOSIS — R04.0 LEFT-SIDED EPISTAXIS: Primary | ICD-10-CM

## 2023-06-20 RX ORDER — OXYMETAZOLINE HYDROCHLORIDE 0.05 G/100ML
2 SPRAY NASAL ONCE
Status: COMPLETED | OUTPATIENT
Start: 2023-06-20 | End: 2023-06-20

## 2023-06-20 RX ADMIN — OXYMETAZOLINE HYDROCHLORIDE 2 SPRAY: 0.05 SPRAY NASAL at 16:59

## 2023-06-20 NOTE — ED PROVIDER NOTES
"History  Chief Complaint   Patient presents with   • Nose Bleed     Patient presents to the ER with a resolved nose bleed  Patient states that it bleeds just about every other day and \"I just have to barely touch it and it begins to bleed  \"     Patient is an 33-year-old female who presents with nosebleed  Patient is here with her  who helps provide additional history as patient has a diagnosis of Alzheimer's dementia and gets confused  Per patient and , she has had a few nosebleeds over the last 1 to 2 weeks  They state that they only last for approximately 3 to 4 minutes and resolved without any applied pressure  Patient denies any headaches, congestion, rhinorrhea  Latest nosebleed was earlier today when they were at [facility]  It was from the left nostril  It stopped on its own in approximately 3 minutes  Prior to Admission Medications   Prescriptions Last Dose Informant Patient Reported? Taking?    Apoaequorin (Prevagen) 10 MG CAPS   Yes No   Sig: Take 1 tablet by mouth   Incontinence Supply Disposable (Depend Adjustable Underwear Lg) MISC   No No   Sig: Use 4 (four) times a day   Multiple Vitamins-Minerals (PRESERVISION AREDS) CAPS   Yes No   Sig: Take 2 capsules by mouth daily   OMEGA-3 FATTY ACIDS PO   Yes No   Sig: Take by mouth   amLODIPine (NORVASC) 10 mg tablet   No No   Sig: Take 1 tablet (10 mg total) by mouth daily   calcium carbonate-vitamin D (OSCAL-D) 500 mg-200 units per tablet   Yes No   Sig: Take 2 tablets by mouth daily with breakfast   memantine (NAMENDA) 10 mg tablet   Yes No   Sig: Take 10 mg by mouth 2 (two) times a day   simvastatin (ZOCOR) 20 mg tablet   No No   Sig: TAKE 1 TABLET BY MOUTH EVERY DAY   sodium chloride (DEEJAY 128) 2 % hypertonic ophthalmic solution   Yes No   Si drop once    valsartan (DIOVAN) 320 MG tablet   No No   Sig: Take 1 tablet (320 mg total) by mouth daily      Facility-Administered Medications: None       Past Medical History: " Diagnosis Date   • Degenerative joint disease (DJD) of hip    • Hyperlipidemia    • Osteopenia    • Osteoporosis        Past Surgical History:   Procedure Laterality Date   • EYE SURGERY     • HIP SURGERY     • JOINT REPLACEMENT      right hip       Family History   Problem Relation Age of Onset   • Breast cancer Mother    • Substance Abuse Neg Hx    • Mental illness Neg Hx      I have reviewed and agree with the history as documented  E-Cigarette/Vaping   • E-Cigarette Use Never User      E-Cigarette/Vaping Substances   • Nicotine No    • THC No    • CBD No    • Flavoring No    • Other No    • Unknown No      Social History     Tobacco Use   • Smoking status: Never   • Smokeless tobacco: Never   Vaping Use   • Vaping Use: Never used   Substance Use Topics   • Alcohol use: Never     Comment: Per [employer]:  Occasional alcohol   • Drug use: No       Review of Systems   Constitutional: Negative for chills and fever  HENT: Positive for nosebleeds  Negative for congestion, sinus pressure and sinus pain  Gastrointestinal: Negative for nausea and vomiting  Neurological: Negative for headaches  Physical Exam  Physical Exam  Vitals and nursing note reviewed  Constitutional:       General: She is not in acute distress  Appearance: Normal appearance  She is not ill-appearing, toxic-appearing or diaphoretic  HENT:      Head: Normocephalic and atraumatic  Nose: Mucosal edema present  No nasal deformity, signs of injury, laceration, nasal tenderness, congestion or rhinorrhea  Right Nostril: No foreign body, epistaxis, septal hematoma or occlusion  Left Nostril: Epistaxis (dried blood visualized in the left naris  no active bleeding ) present  No foreign body, septal hematoma or occlusion  Left Turbinates: Enlarged and swollen        Mouth/Throat:      Mouth: Mucous membranes are moist    Eyes:      Conjunctiva/sclera: Conjunctivae normal       Pupils: Pupils are equal, round, and reactive to light  Cardiovascular:      Rate and Rhythm: Normal rate and regular rhythm  Pulmonary:      Effort: Pulmonary effort is normal  No respiratory distress  Breath sounds: Normal breath sounds  No stridor  No wheezing, rhonchi or rales  Chest:      Chest wall: No tenderness  Skin:     General: Skin is warm and dry  Neurological:      General: No focal deficit present  Mental Status: She is alert  Mental status is at baseline  She is disoriented  Vital Signs  ED Triage Vitals [06/20/23 1435]   Temperature Pulse Respirations Blood Pressure SpO2   97 9 °F (36 6 °C) 71 17 148/72 97 %      Temp Source Heart Rate Source Patient Position - Orthostatic VS BP Location FiO2 (%)   Oral Monitor Sitting Right arm --      Pain Score       No Pain           Vitals:    06/20/23 1435 06/20/23 1658   BP: 148/72    Pulse: 71 63   Patient Position - Orthostatic VS: Sitting Sitting         Visual Acuity      ED Medications  Medications   oxymetazoline (AFRIN) 0 05 % nasal spray 2 spray (2 sprays Each Nare Given 6/20/23 1659)       Diagnostic Studies  Results Reviewed     None                 No orders to display              Procedures  Procedures         ED Course                               SBIRT 20yo+    Flowsheet Row Most Recent Value   Initial Alcohol Screen: US AUDIT-C     1  How often do you have a drink containing alcohol? 0 Filed at: 06/20/2023 1437   Audit-C Score 0 Filed at: 06/20/2023 1437   STEPHANY: How many times in the past year have you    Used an illegal drug or used a prescription medication for non-medical reasons? Never Filed at: 06/20/2023 1437                    Medical Decision Making  Assessment and plan:  Resolved left-sided nosebleed  Will spray 2 puffs of Afrin in bilateral naris, send patient home with blue nasal clip in case nosebleed restarts in order to apply direct pressure and recommended Vaseline application as barrier    Placed referral for outpatient follow-up with otolaryngology  Reviewed strict return to the emergency department precautions which patient  verbalized understanding of  Review of medical records from primary care note on 3/3/2023 shows that the patient has a past medical history significant for Alzheimer's dementia without behavioral disturbance, hypertension, hyperlipidemia  Risk  OTC drugs  Disposition  Final diagnoses:   Left-sided epistaxis     Time reflects when diagnosis was documented in both MDM as applicable and the Disposition within this note     Time User Action Codes Description Comment    6/20/2023  4:52 PM Frances Cotton Add [R04 0] Left-sided epistaxis       ED Disposition     ED Disposition   Discharge    Condition   Stable    Date/Time   Tue Jun 20, 2023  4:52 PM    Comment   Mariposa Morocho discharge to home/self care                 Follow-up Information     Follow up With Specialties Details Why Contact Info Additional [id number] Benjamin Gaffney MD Otolaryngology Schedule an appointment as soon as possible for a visit in 1 week for re-evaluation [phone] Karel White [phone]        [address] Emergency Department Emergency Medicine Go to  As needed, If symptoms worsen, for re-evaluation [address] [place] [address], [phone]  [address] [facility] Emergency Department, [address] [place] [address] Joe [address]          Patient's Medications   Discharge Prescriptions    No medications on file           PDMP Review     None          ED Provider  Electronically Signed by           Samantha Marlow DO  06/20/23 4936

## 2023-07-11 DIAGNOSIS — I10 BENIGN ESSENTIAL HYPERTENSION: ICD-10-CM

## 2023-07-11 RX ORDER — AMLODIPINE BESYLATE 10 MG/1
TABLET ORAL
Qty: 30 TABLET | Refills: 3 | Status: SHIPPED | OUTPATIENT
Start: 2023-07-11

## 2023-08-09 DIAGNOSIS — E78.00 HYPERCHOLESTEREMIA: ICD-10-CM

## 2023-08-09 RX ORDER — SIMVASTATIN 20 MG
20 TABLET ORAL DAILY
Qty: 90 TABLET | Refills: 3 | Status: SHIPPED | OUTPATIENT
Start: 2023-08-09

## 2023-08-29 ENCOUNTER — RA CDI HCC (OUTPATIENT)
Dept: OTHER | Facility: HOSPITAL | Age: 83
End: 2023-08-29

## 2023-08-29 NOTE — PROGRESS NOTES
Severe obesity e66.01  [facility] coding opportunities          Chart Reviewed number of suggestions sent to Provider: 1     Patients Insurance     Medicare Insurance: [place]

## 2023-09-06 ENCOUNTER — OFFICE VISIT (OUTPATIENT)
Dept: FAMILY MEDICINE CLINIC | Facility: HOSPITAL | Age: 83
End: 2023-09-06
Payer: MEDICARE

## 2023-09-06 VITALS
SYSTOLIC BLOOD PRESSURE: 140 MMHG | HEART RATE: 69 BPM | WEIGHT: 236 LBS | DIASTOLIC BLOOD PRESSURE: 82 MMHG | OXYGEN SATURATION: 97 % | BODY MASS INDEX: 34.96 KG/M2 | HEIGHT: 69 IN

## 2023-09-06 DIAGNOSIS — E66.01 MORBID OBESITY (HCC): ICD-10-CM

## 2023-09-06 DIAGNOSIS — F02.80 EARLY ONSET ALZHEIMER'S DEMENTIA WITHOUT BEHAVIORAL DISTURBANCE (HCC): Primary | ICD-10-CM

## 2023-09-06 DIAGNOSIS — R04.0 RIGHT-SIDED NOSEBLEED: ICD-10-CM

## 2023-09-06 DIAGNOSIS — Z59.89 INSURANCE COVERAGE PROBLEMS: ICD-10-CM

## 2023-09-06 DIAGNOSIS — E78.00 PURE HYPERCHOLESTEROLEMIA: ICD-10-CM

## 2023-09-06 DIAGNOSIS — G30.0 EARLY ONSET ALZHEIMER'S DEMENTIA WITHOUT BEHAVIORAL DISTURBANCE (HCC): Primary | ICD-10-CM

## 2023-09-06 DIAGNOSIS — I10 BENIGN ESSENTIAL HYPERTENSION: ICD-10-CM

## 2023-09-06 PROCEDURE — 99214 OFFICE O/P EST MOD 30 MIN: CPT | Performed by: STUDENT IN AN ORGANIZED HEALTH CARE EDUCATION/TRAINING PROGRAM

## 2023-09-06 SDOH — ECONOMIC STABILITY - INCOME SECURITY: OTHER PROBLEMS RELATED TO HOUSING AND ECONOMIC CIRCUMSTANCES: Z59.89

## 2023-09-06 NOTE — PROGRESS NOTES
[address], DO    Assessment/Plan:      Diagnosis ICD-10-CM Associated Orders   1. Early onset Alzheimer's dementia without behavioral disturbance ([address] )  G30.0 Ambulatory Referral to Social Work Care Management Program    F02.80       2. Morbid obesity ([address])  E66.01       3. Insurance coverage problems  Z59.89 Ambulatory Referral to Social Work Care Management Program      4. Right-sided nosebleed  R04.0       5. BMI 35.0-35.9,adult  Z68.35       6. Benign essential hypertension  I10       7. Pure hypercholesterolemia  E78.00         • Vaseline in nose as needed for moisture. • MSW referral given. • C/W Dr. Froylan Camargo. • No med changes. Return in about 6 months (around 3/6/2024) for Annual Medicare Wellness. • Patient may call or return to office with any questions or concerns. ______________________________________________________________________  Subjective:     Patient ID: Keira Siu is a 82 y.o. female. HPI  Keira Siu  Chief Complaint   Patient presents with   • Follow-up     Has had several episodes of nose bleeds. Nose Bleed       Patient presents to the ER with a resolved nose bleed. Patient states that it bleeds just about every other day and "I just have to barely touch it and it begins to bleed."   Not sure if it was always the same side     F/U with Dr. rFoylan Camargo from [facility] on 10/12/23. Taking namenda bid & prevagen once a day. Not feeling ill or coughing recently.  with her & drives. Wt Readings from Last 3 Encounters:   09/06/23 107 kg (236 lb)   06/20/23 110 kg (242 lb)   03/03/23 110 kg (242 lb)     Good appetite, no intentional weight changes. The following portions of the patient's history were reviewed and updated as appropriate: allergies, current medications, past medical history, and problem list.    Review of Systems   Constitutional: Negative for chills and fever. HENT: Positive for nosebleeds. Respiratory: Negative for cough and shortness of breath. Cardiovascular: Positive for leg swelling. Negative for chest pain and palpitations. Neurological: Negative for dizziness and light-headedness. Psychiatric/Behavioral:        Poor memory       Objective:      Vitals:    09/06/23 0912   BP: 140/82   Pulse: 69   SpO2: 97%      Physical Exam  Vitals reviewed. Constitutional:       General: She is not in acute distress. Appearance: Normal appearance. She is well-developed. She is not ill-appearing. HENT:      Head: Normocephalic and atraumatic. Nose:      Comments: Prior nose bleed coming from Medial right nare  Eyes:      General: No scleral icterus. Right eye: No discharge. Left eye: No discharge. Cardiovascular:      Rate and Rhythm: Normal rate and regular rhythm. Pulses: Normal pulses. Heart sounds: Normal heart sounds. No murmur heard. Pulmonary:      Effort: Pulmonary effort is normal. No respiratory distress. Breath sounds: Normal breath sounds. No stridor. No wheezing. Musculoskeletal:      Cervical back: Normal range of motion. No rigidity. Right lower leg: Edema (2 +) present. Left lower leg: Edema (2+ mid shin) present. Skin:     General: Skin is warm. Neurological:      Mental Status: She is alert and oriented to person, place, and time. Gait: Gait normal.   Psychiatric:         Mood and Affect: Mood normal.         Behavior: Behavior normal.         Thought Content: Thought content normal.         Judgment: Judgment normal.           Portions of the record may have been created with voice recognition software. Occasional wrong word or "sound alike" substitutions may have occurred due to the inherent limitations of voice recognition software. Please review the chart carefully and recognize, using context, where substitutions/typographical errors may have occurred.

## 2023-09-07 ENCOUNTER — PATIENT OUTREACH (OUTPATIENT)
Dept: FAMILY MEDICINE CLINIC | Facility: HOSPITAL | Age: 83
End: 2023-09-07

## 2023-09-07 NOTE — PROGRESS NOTES
ROBBY ANDERSEN received a referral from patient's PCP. ROBBY ANDERSEN reviewed chart. No reason for referral provided. ROBBY ANDERSEN called patient ([phone]). Patient did not answer, but her  Francis answered and ROBBY ANDERSEN introduced ROBBY ANDERSEN role. Berj stated that they would like to know if patient is entitled to a home health aid. ROBBY ANDERSEN completed SOC psychosocial assessment with Francis for patient. Patient lives with Nael Thapa in a home they own and have a mortgage on. Patient is retired. Berj is unsure of patient's monthly income. However, Berj did state they have over $10,000 in savings. Patient and Berj have no financial issues. Both have living franco. Katja Blevins is looking for assistance for for patient with bathing. Patient is not a  and she is over income. ROBBY ANDERSEN reviewed private pay HHA. Patient and Berj do not have a computer so they requested that I mail it to them. ROBBY ANDERSEN will place list of HHA in outgoing mail. ROBBY ANDERSEN provided them with ROBBY ANDERSEN contact information and encouraged them to contact ROBBY ANDERSEN as needed. ROBBY Andersen will close, please re consult ROBBY ANDERSEN as needed.

## 2023-11-07 DIAGNOSIS — I10 BENIGN ESSENTIAL HYPERTENSION: ICD-10-CM

## 2023-11-07 RX ORDER — AMLODIPINE BESYLATE 10 MG/1
TABLET ORAL
Qty: 30 TABLET | Refills: 3 | Status: SHIPPED | OUTPATIENT
Start: 2023-11-07

## 2024-03-12 DIAGNOSIS — I10 BENIGN ESSENTIAL HYPERTENSION: ICD-10-CM

## 2024-03-13 RX ORDER — AMLODIPINE BESYLATE 10 MG/1
TABLET ORAL
Qty: 90 TABLET | Refills: 3 | Status: SHIPPED | OUTPATIENT
Start: 2024-03-13

## 2024-05-03 DIAGNOSIS — I10 ESSENTIAL HYPERTENSION: ICD-10-CM

## 2024-05-05 RX ORDER — VALSARTAN 320 MG/1
320 TABLET ORAL DAILY
Qty: 30 TABLET | Refills: 0 | Status: SHIPPED | OUTPATIENT
Start: 2024-05-05

## 2024-05-28 DIAGNOSIS — I10 ESSENTIAL HYPERTENSION: ICD-10-CM

## 2024-05-30 RX ORDER — VALSARTAN 320 MG/1
320 TABLET ORAL DAILY
Qty: 90 TABLET | Refills: 1 | Status: SHIPPED | OUTPATIENT
Start: 2024-05-30

## 2024-08-06 DIAGNOSIS — E78.00 HYPERCHOLESTEREMIA: ICD-10-CM

## 2024-08-07 RX ORDER — SIMVASTATIN 20 MG
20 TABLET ORAL DAILY
Qty: 90 TABLET | Refills: 0 | Status: SHIPPED | OUTPATIENT
Start: 2024-08-07

## 2024-08-08 PROBLEM — M25.562 LEFT KNEE PAIN: Status: RESOLVED | Noted: 2021-03-14 | Resolved: 2024-08-08

## 2024-11-29 DIAGNOSIS — E78.00 HYPERCHOLESTEREMIA: ICD-10-CM

## 2024-11-29 DIAGNOSIS — I10 ESSENTIAL HYPERTENSION: ICD-10-CM

## 2024-11-29 RX ORDER — SIMVASTATIN 20 MG
20 TABLET ORAL DAILY
Qty: 90 TABLET | Refills: 0 | Status: SHIPPED | OUTPATIENT
Start: 2024-11-29

## 2024-11-29 RX ORDER — VALSARTAN 320 MG/1
320 TABLET ORAL DAILY
Qty: 90 TABLET | Refills: 0 | Status: SHIPPED | OUTPATIENT
Start: 2024-11-29

## 2025-01-06 ENCOUNTER — APPOINTMENT (OUTPATIENT)
Dept: LAB | Facility: HOSPITAL | Age: 85
End: 2025-01-06
Payer: MEDICARE

## 2025-01-06 DIAGNOSIS — R41.3 CONFABULATION: ICD-10-CM

## 2025-01-06 LAB
ALBUMIN SERPL BCG-MCNC: 3.9 G/DL (ref 3.5–5)
ALP SERPL-CCNC: 59 U/L (ref 34–104)
ALT SERPL W P-5'-P-CCNC: 12 U/L (ref 7–52)
ANION GAP SERPL CALCULATED.3IONS-SCNC: 12 MMOL/L (ref 4–13)
AST SERPL W P-5'-P-CCNC: 21 U/L (ref 13–39)
BILIRUB SERPL-MCNC: 0.77 MG/DL (ref 0.2–1)
BUN SERPL-MCNC: 20 MG/DL (ref 5–25)
CALCIUM SERPL-MCNC: 9.2 MG/DL (ref 8.4–10.2)
CHLORIDE SERPL-SCNC: 107 MMOL/L (ref 96–108)
CO2 SERPL-SCNC: 23 MMOL/L (ref 21–32)
CREAT SERPL-MCNC: 0.88 MG/DL (ref 0.6–1.3)
ERYTHROCYTE [DISTWIDTH] IN BLOOD BY AUTOMATED COUNT: 13.2 % (ref 11.6–15.1)
GFR SERPL CREATININE-BSD FRML MDRD: 61 ML/MIN/1.73SQ M
GLUCOSE P FAST SERPL-MCNC: 117 MG/DL (ref 65–99)
HCT VFR BLD AUTO: 41.5 % (ref 34.8–46.1)
HGB BLD-MCNC: 13.2 G/DL (ref 11.5–15.4)
MCH RBC QN AUTO: 29.9 PG (ref 26.8–34.3)
MCHC RBC AUTO-ENTMCNC: 31.8 G/DL (ref 31.4–37.4)
MCV RBC AUTO: 94 FL (ref 82–98)
PLATELET # BLD AUTO: 161 THOUSANDS/UL (ref 149–390)
PMV BLD AUTO: 13.7 FL (ref 8.9–12.7)
POTASSIUM SERPL-SCNC: 4.8 MMOL/L (ref 3.5–5.3)
PROT SERPL-MCNC: 6.8 G/DL (ref 6.4–8.4)
RBC # BLD AUTO: 4.42 MILLION/UL (ref 3.81–5.12)
SODIUM SERPL-SCNC: 142 MMOL/L (ref 135–147)
TSH SERPL DL<=0.05 MIU/L-ACNC: 1.75 UIU/ML (ref 0.45–4.5)
VIT B12 SERPL-MCNC: 141 PG/ML (ref 180–914)
WBC # BLD AUTO: 7.72 THOUSAND/UL (ref 4.31–10.16)

## 2025-01-06 PROCEDURE — 80053 COMPREHEN METABOLIC PANEL: CPT

## 2025-01-06 PROCEDURE — 84443 ASSAY THYROID STIM HORMONE: CPT

## 2025-01-06 PROCEDURE — 85027 COMPLETE CBC AUTOMATED: CPT

## 2025-01-06 PROCEDURE — 82607 VITAMIN B-12: CPT

## 2025-01-06 PROCEDURE — 36415 COLL VENOUS BLD VENIPUNCTURE: CPT

## 2025-01-10 ENCOUNTER — OFFICE VISIT (OUTPATIENT)
Dept: FAMILY MEDICINE CLINIC | Facility: HOSPITAL | Age: 85
End: 2025-01-10
Payer: MEDICARE

## 2025-01-10 VITALS
WEIGHT: 240 LBS | BODY MASS INDEX: 36.37 KG/M2 | HEART RATE: 70 BPM | HEIGHT: 68 IN | OXYGEN SATURATION: 98 % | DIASTOLIC BLOOD PRESSURE: 82 MMHG | SYSTOLIC BLOOD PRESSURE: 110 MMHG

## 2025-01-10 DIAGNOSIS — E53.8 B12 DEFICIENCY: ICD-10-CM

## 2025-01-10 DIAGNOSIS — E66.01 MORBID OBESITY (HCC): ICD-10-CM

## 2025-01-10 DIAGNOSIS — N18.2 CKD (CHRONIC KIDNEY DISEASE) STAGE 2, GFR 60-89 ML/MIN: ICD-10-CM

## 2025-01-10 DIAGNOSIS — G30.0 EARLY ONSET ALZHEIMER'S DEMENTIA WITHOUT BEHAVIORAL DISTURBANCE (HCC): ICD-10-CM

## 2025-01-10 DIAGNOSIS — N39.46 MIXED STRESS AND URGE URINARY INCONTINENCE: ICD-10-CM

## 2025-01-10 DIAGNOSIS — F02.80 EARLY ONSET ALZHEIMER'S DEMENTIA WITHOUT BEHAVIORAL DISTURBANCE (HCC): ICD-10-CM

## 2025-01-10 DIAGNOSIS — D69.6 PLATELETS DECREASED (HCC): ICD-10-CM

## 2025-01-10 DIAGNOSIS — Z00.00 MEDICARE ANNUAL WELLNESS VISIT, SUBSEQUENT: Primary | ICD-10-CM

## 2025-01-10 PROCEDURE — G0444 DEPRESSION SCREEN ANNUAL: HCPCS | Performed by: STUDENT IN AN ORGANIZED HEALTH CARE EDUCATION/TRAINING PROGRAM

## 2025-01-10 PROCEDURE — G0439 PPPS, SUBSEQ VISIT: HCPCS | Performed by: STUDENT IN AN ORGANIZED HEALTH CARE EDUCATION/TRAINING PROGRAM

## 2025-01-10 RX ORDER — CYANOCOBALAMIN 1000 UG/ML
1000 INJECTION, SOLUTION INTRAMUSCULAR; SUBCUTANEOUS ONCE
Status: COMPLETED | OUTPATIENT
Start: 2025-01-10 | End: 2025-01-10

## 2025-01-10 RX ADMIN — CYANOCOBALAMIN 1000 MCG: 1000 INJECTION, SOLUTION INTRAMUSCULAR; SUBCUTANEOUS at 12:50

## 2025-01-10 NOTE — PATIENT INSTRUCTIONS
[facility]    Medicare Preventive Visit Patient Instructions  Thank you for completing your Welcome to Medicare Visit or Medicare Annual Wellness Visit today. Your next wellness visit will be due in one year (1/11/2026).  The screening/preventive services that you may require over the next 5-10 years are detailed below. Some tests may not apply to you based off risk factors and/or age. Screening tests ordered at today's visit but not completed yet may show as past due. Also, please note that scanned in results may not display below.  Preventive Screenings:  Service Recommendations Previous Testing/Comments   Colorectal Cancer Screening  * Colonoscopy    * Fecal Occult Blood Test (FOBT)/Fecal Immunochemical Test (FIT)  * Fecal DNA/Cologuard Test  * Flexible Sigmoidoscopy Age: 45-75 years old   Colonoscopy: every 10 years (may be performed more frequently if at higher risk)  OR  FOBT/FIT: every 1 year  OR  Cologuard: every 3 years  OR  Sigmoidoscopy: every 5 years  Screening may be recommended earlier than age 45 if at higher risk for colorectal cancer. Also, an individualized decision between you and your healthcare provider will decide whether screening between the ages of 76-85 would be appropriate. Colonoscopy: 11/07/2013  FOBT/FIT: Not on file  Cologuard: Not on file  Sigmoidoscopy: Not on file          Breast Cancer Screening Age: 40+ years old  Frequency: every 1-2 years  Not required if history of left and right mastectomy Mammogram: 09/06/2018        Cervical Cancer Screening Between the ages of 21-29, pap smear recommended once every 3 years.   Between the ages of 30-65, can perform pap smear with HPV co-testing every 5 years.   Recommendations may differ for women with a history of total hysterectomy, cervical cancer, or abnormal pap smears in past. Pap Smear: Not on file    Screening Not Indicated   Hepatitis C Screening Once for adults born between 1945  and 1965  More frequently in patients at high risk for Hepatitis C Hep C Antibody: Not on file        Diabetes Screening 1-2 times per year if you're at risk for diabetes or have pre-diabetes Fasting glucose: 117 mg/dL (1/6/2025)  A1C: No results in last 5 years (No results in last 5 years)  Screening Current   Cholesterol Screening Once every 5 years if you don't have a lipid disorder. May order more often based on risk factors. Lipid panel: 03/03/2023    Screening Not Indicated  History Lipid Disorder     Other Preventive Screenings Covered by Medicare:  Abdominal Aortic Aneurysm (AAA) Screening: covered once if your at risk. You're considered to be at risk if you have a family history of AAA.  Lung Cancer Screening: covers low dose CT scan once per year if you meet all of the following conditions: (1) Age 55-77; (2) No signs or symptoms of lung cancer; (3) Current smoker or have quit smoking within the last 15 years; (4) You have a tobacco smoking history of at least 20 pack years (packs per day multiplied by number of years you smoked); (5) You get a written order from a healthcare provider.  Glaucoma Screening: covered annually if you're considered high risk: (1) You have diabetes OR (2) Family history of glaucoma OR (3)  aged 50 and older OR (4)  American aged 65 and older  Osteoporosis Screening: covered every 2 years if you meet one of the following conditions: (1) You're estrogen deficient and at risk for osteoporosis based off medical history and other findings; (2) Have a vertebral abnormality; (3) On glucocorticoid therapy for more than 3 months; (4) Have primary hyperparathyroidism; (5) On osteoporosis medications and need to assess response to drug therapy.   Last bone density test (DXA Scan): 06/30/2017.  HIV Screening: covered annually if you're between the age of 15-65. Also covered annually if you are younger than 15 and older than 65 with risk factors for HIV infection. For  pregnant patients, it is covered up to 3 times per pregnancy.    Immunizations:  Immunization Recommendations   Influenza Vaccine Annual influenza vaccination during flu season is recommended for all persons aged >= 6 months who do not have contraindications   Pneumococcal Vaccine   * Pneumococcal conjugate vaccine = PCV13 (Prevnar 13), PCV15 (Vaxneuvance), PCV20 (Prevnar 20)  * Pneumococcal polysaccharide vaccine = PPSV23 (Pneumovax) Adults 19-65 yo with certain risk factors or if 65+ yo  If never received any pneumonia vaccine: recommend Prevnar 20 (PCV20)  Give PCV20 if previously received 1 dose of PCV13 or PPSV23   Hepatitis B Vaccine 3 dose series if at intermediate or high risk (ex: diabetes, end stage renal disease, liver disease)   Respiratory syncytial virus (RSV) Vaccine - COVERED BY MEDICARE PART D  * RSVPreF3 (Arexvy) CDC recommends that adults 60 years of age and older may receive a single dose of RSV vaccine using shared clinical decision-making (SCDM)   Tetanus (Td) Vaccine - COST NOT COVERED BY MEDICARE PART B Following completion of primary series, a booster dose should be given every 10 years to maintain immunity against tetanus. Td may also be given as tetanus wound prophylaxis.   Tdap Vaccine - COST NOT COVERED BY MEDICARE PART B Recommended at least once for all adults. For pregnant patients, recommended with each pregnancy.   Shingles Vaccine (Shingrix) - COST NOT COVERED BY MEDICARE PART B  2 shot series recommended in those 19 years and older who have or will have weakened immune systems or those 50 years and older     Health Maintenance Due:  There are no preventive care reminders to display for this patient.  Immunizations Due:      Topic Date Due    Influenza Vaccine (1) 09/01/2024    COVID-19 Vaccine (1 - 2024-25 season) Never done     Advance Directives   What are advance directives?  Advance directives are legal documents that state your wishes and plans for medical care. These plans  are made ahead of time in case you lose your ability to make decisions for yourself. Advance directives can apply to any medical decision, such as the treatments you want, and if you want to donate organs.   What are the types of advance directives?  There are many types of advance directives, and each state has rules about how to use them. You may choose a combination of any of the following:  Living will:  This is a written record of the treatment you want. You can also choose which treatments you do not want, which to limit, and which to stop at a certain time. This includes surgery, medicine, IV fluid, and tube feedings.   Durable power of  for healthcare (DPAHC):  This is a written record that states who you want to make healthcare choices for you when you are unable to make them for yourself. This person, called a proxy, is usually a family member or a friend. You may choose more than 1 proxy.  Do not resuscitate (DNR) order:  A DNR order is used in case your heart stops beating or you stop breathing. It is a request not to have certain forms of treatment, such as CPR. A DNR order may be included in other types of advance directives.  Medical directive:  This covers the care that you want if you are in a coma, near death, or unable to make decisions for yourself. You can list the treatments you want for each condition. Treatment may include pain medicine, surgery, blood transfusions, dialysis, IV or tube feedings, and a ventilator (breathing machine).  Values history:  This document has questions about your views, beliefs, and how you feel and think about life. This information can help others choose the care that you would choose.  Why are advance directives important?  An advance directive helps you control your care. Although spoken wishes may be used, it is better to have your wishes written down. Spoken wishes can be misunderstood, or not followed. Treatments may be given even if you do not want  them. An advance directive may make it easier for your family to make difficult choices about your care.   Urinary Incontinence   Urinary incontinence (UI)  is when you lose control of your bladder. UI develops because your bladder cannot store or empty urine properly. The 3 most common types of UI are stress incontinence, urge incontinence, or both.  Medicines:   May be given to help strengthen your bladder control. Report any side effects of medication to your healthcare provider.  Do pelvic muscle exercises often:  Your pelvic muscles help you stop urinating. Squeeze these muscles tight for 5 seconds, then relax for 5 seconds. Gradually work up to squeezing for 10 seconds. Do 3 sets of 15 repetitions a day, or as directed. This will help strengthen your pelvic muscles and improve bladder control.  Train your bladder:  Go to the bathroom at set times, such as every 2 hours, even if you do not feel the urge to go. You can also try to hold your urine when you feel the urge to go. For example, hold your urine for 5 minutes when you feel the urge to go. As that becomes easier, hold your urine for 10 minutes.   Self-care:   Keep a UI record.  Write down how often you leak urine and how much you leak. Make a note of what you were doing when you leaked urine.  Drink liquids as directed. You may need to limit the amount of liquid you drink to help control your urine leakage. Do not drink any liquid right before you go to bed. Limit or do not have drinks that contain caffeine or alcohol.   Prevent constipation.  Eat a variety of high-fiber foods. Good examples are high-fiber cereals, beans, vegetables, and whole-grain breads. Walking is the best way to trigger your intestines to have a bowel movement.  Exercise regularly and maintain a healthy weight.  Weight loss and exercise will decrease pressure on your bladder and help you control your leakage.   Use a catheter as directed  to help empty your bladder. A catheter is a  tiny, plastic tube that is put into your bladder to drain your urine.   Go to behavior therapy as directed.  Behavior therapy may be used to help you learn to control your urge to urinate.    Weight Management   Why it is important to manage your weight:  Being overweight increases your risk of health conditions such as heart disease, high blood pressure, type 2 diabetes, and certain types of cancer. It can also increase your risk for osteoarthritis, sleep apnea, and other respiratory problems. Aim for a slow, steady weight loss. Even a small amount of weight loss can lower your risk of health problems.  How to lose weight safely:  A safe and healthy way to lose weight is to eat fewer calories and get regular exercise. You can lose up about 1 pound a week by decreasing the number of calories you eat by 500 calories each day.   Healthy meal plan for weight management:  A healthy meal plan includes a variety of foods, contains fewer calories, and helps you stay healthy. A healthy meal plan includes the following:  Eat whole-grain foods more often.  A healthy meal plan should contain fiber. Fiber is the part of grains, fruits, and vegetables that is not broken down by your body. Whole-grain foods are healthy and provide extra fiber in your diet. Some examples of whole-grain foods are whole-wheat breads and pastas, oatmeal, brown rice, and bulgur.  Eat a variety of vegetables every day.  Include dark, leafy greens such as spinach, kale, maxim greens, and mustard greens. Eat yellow and orange vegetables such as carrots, sweet potatoes, and winter squash.   Eat a variety of fruits every day.  Choose fresh or canned fruit (canned in its own juice or light syrup) instead of juice. Fruit juice has very little or no fiber.  Eat low-fat dairy foods.  Drink fat-free (skim) milk or 1% milk. Eat fat-free yogurt and low-fat cottage cheese. Try low-fat cheeses such as mozzarella and other reduced-fat cheeses.  Choose meat and  other protein foods that are low in fat.  Choose beans or other legumes such as split peas or lentils. Choose fish, skinless poultry (chicken or turkey), or lean cuts of red meat (beef or pork). Before you cook meat or poultry, cut off any visible fat.   Use less fat and oil.  Try baking foods instead of frying them. Add less fat, such as margarine, sour cream, regular salad dressing and mayonnaise to foods. Eat fewer high-fat foods. Some examples of high-fat foods include french fries, doughnuts, ice cream, and cakes.  Eat fewer sweets.  Limit foods and drinks that are high in sugar. This includes candy, cookies, regular soda, and sweetened drinks.  Exercise:  Exercise at least 30 minutes per day on most days of the week. Some examples of exercise include walking, biking, dancing, and swimming. You can also fit in more physical activity by taking the stairs instead of the elevator or parking farther away from stores. Ask your healthcare provider about the best exercise plan for you.      © Copyright [employer] 2018 Information is for End User's use only and may not be sold, redistributed or otherwise used for commercial purposes. All illustrations and images included in CareNotes® are the copyrighted property of [employer] or [employer]

## 2025-01-10 NOTE — PROGRESS NOTES
Name: Johanna Alfonso      : 1940      MRN: [id number]  Encounter Provider: Missy Rodriguez DO  Encounter Date: 1/10/2025   Encounter department: [facility] PRIMARY [facility] [address]    Assessment & Plan  Medicare annual wellness visit, subsequent  Topics as below.        CKD (chronic kidney disease) stage 2, GFR 60-89 ml/min  Lab Results   Component Value Date    EGFR 61 2025    EGFR 64 2023    EGFR 64 2021    CREATININE 0.88 2025    CREATININE 0.85 2023    CREATININE 0.78 10/07/2021     Stable, discussed today. Limited water due to incontinence.        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)  F/W [facility].        Platelets decreased (HCC)  Stable.        Morbid obesity (HCC)  No meds indicated at this time.        Mixed stress and urge urinary incontinence  In depends already        B12 deficiency  Dose today in arm & oral sent by [facility] team.   Orders:  •  cyanocobalamin injection 1,000 mcg      Depression Screening and Follow-up Plan: Patient was screened for depression during today's encounter. They screened negative with a PHQ-2 score of 0.    Urinary Incontinence Plan of Care: counseling topics discussed: preventing constipation. Uses diapers - has dementia hard to fix - already limited water - likes coffee and root beer some .       Preventive health issues were discussed with patient, and age appropriate screening tests were ordered as noted in patient's After Visit Summary. Personalized health advice and appropriate referrals for health education or preventive services given if needed, as noted in patient's After Visit Summary.    History of Present Illness     HPI   Here with , sole caregiver.     Patient Care Team:  Missy Rodriguez DO as PCP - General (Family Medicine)    Wt Readings from Last 3 Encounters:   01/10/25 109 kg (240 lb)   23 107 kg (236 lb)   23 110 kg (242 lb)     Temp Readings from Last  3 Encounters:   06/20/23 97.9 °F (36.6 °C) (Oral)   09/13/21 98.4 °F (36.9 °C)   04/07/21 98.4 °F (36.9 °C)     BP Readings from Last 3 Encounters:   01/10/25 110/82   09/06/23 140/82   06/20/23 160/65     Pulse Readings from Last 3 Encounters:   01/10/25 70   09/06/23 69   06/20/23 63     Review of Systems   Unable to perform ROS: Dementia   Constitutional:  Negative for chills and fever.   Respiratory:  Negative for cough and shortness of breath.    Cardiovascular:  Negative for chest pain and palpitations.   Musculoskeletal:  Negative for arthralgias and gait problem (uses walker for safety at home or out).   Neurological:  Negative for dizziness, light-headedness and headaches.     Medical History Reviewed by provider this encounter:  Tobacco  Allergies  Meds  Problems  Med Hx  Surg Hx  Fam Hx       Annual Wellness Visit Questionnaire   Johanna is here for her Subsequent Wellness visit.     Health Risk Assessment:   Patient rates overall health as good. Patient feels that their physical health rating is same. Patient is satisfied with their life. Eyesight was rated as slightly worse. Hearing was rated as slightly better. Patient feels that their emotional and mental health rating is slightly worse. Patients states they are never, rarely angry. Patient states they are never, rarely unusually tired/fatigued. Pain experienced in the last 7 days has been none. Patient states that she has experienced no weight loss or gain in last 6 months.     Depression Screening:   PHQ-2 Score: 0      Fall Risk Screening:   In the past year, patient has experienced: no history of falling in past year      Urinary Incontinence Screening:   Patient has leaked urine accidently in the last six months.     Home Safety:  Patient has trouble with stairs inside or outside of their home. Patient has working smoke alarms and has working carbon monoxide detector. Home safety hazards include: none.     Nutrition:   Current diet is  Regular.     Medications:   Patient is currently taking over-the-counter supplements. OTC medications include: see medication list. Patient is not able to manage medications.     Activities of Daily Living (ADLs)/Instrumental Activities of Daily Living (IADLs):   Walk and transfer into and out of bed and chair?: No  Dress and groom yourself?: Yes    Bathe or shower yourself?: No    Feed yourself? Yes  Do your laundry/housekeeping?: Yes  Manage your money, pay your bills and track your expenses?: Yes  Make your own meals?: Yes    Do your own shopping?: Yes    Previous Hospitalizations:   Any hospitalizations or ED visits within the last 12 months?: No      Advance Care Planning:   Living will: Yes    Durable POA for healthcare: Yes    Advanced directive: Yes      Cognitive Screening:   Provider or family/friend/caregiver concerned regarding cognition?: No    PREVENTIVE SCREENINGS      Cardiovascular Screening:    General: Screening Not Indicated and History Lipid Disorder      Diabetes Screening:     General: Screening Current      Colorectal Cancer Screening:     General: Screening Not Indicated      Breast Cancer Screening:     General: Screening Not Indicated      Cervical Cancer Screening:    General: Screening Not Indicated      Osteoporosis Screening:    General: Screening Not Indicated      Abdominal Aortic Aneurysm (AAA) Screening:        General: Screening Not Indicated      Lung Cancer Screening:     General: Screening Not Indicated      Hepatitis C Screening:    General: Screening Not Indicated    Screening, Brief Intervention, and Referral to Treatment (SBIRT)    Screening      AUDIT-C Screenin) How often did you have a drink containing alcohol in the past year? monthly or less  2) How many drinks did you have on a typical day when you were drinking in the past year? 1 to 2  3) How often did you have 6 or more drinks on one occasion in the past year? never    AUDIT-C Score: 1  Interpretation: Score  "0-2 (female): Negative screen for alcohol misuse    Single Item Drug Screening:  How often have you used an illegal drug (including marijuana) or a prescription medication for non-medical reasons in the past year? never    Single Item Drug Screen Score: 0  Interpretation: Negative screen for possible drug use disorder    Annual Depression Screening  Time spent screening and evaluating the patient for depression during today's encounter was 5 minutes.    Other Counseling Topics:   Car/seat belt/driving safety, skin self-exam, sunscreen and calcium and vitamin D intake and regular weightbearing exercise.     Social Drivers of Health     Financial Resource Strain: Low Risk  (3/3/2023)    Overall Financial Resource Strain (CARDIA)    • Difficulty of Paying Living Expenses: Not very hard   Food Insecurity: No Food Insecurity (1/10/2025)    Hunger Vital Sign    • Worried About Running Out of Food in the Last Year: Never true    • Ran Out of Food in the Last Year: Never true   Transportation Needs: No Transportation Needs (1/10/2025)    PRAPARE - Transportation    • Lack of Transportation (Medical): No    • Lack of Transportation (Non-Medical): No   Housing Stability: Low Risk  (1/10/2025)    Housing Stability Vital Sign    • Unable to Pay for Housing in the Last Year: No    • Number of Times Moved in the Last Year: 1    • Homeless in the Last Year: No   Utilities: Not At Risk (1/10/2025)    [facility] Utilities    • Threatened with loss of utilities: No     No results found.    Objective   /82   Pulse 70   Ht 5' 8\" (1.727 m)   Wt 109 kg (240 lb)   LMP  (LMP Unknown)   SpO2 98%   BMI 36.49 kg/m²     Physical Exam  Vitals and nursing note reviewed.   Constitutional:       General: She is not in acute distress.     Appearance: Normal appearance. She is well-developed. She is obese. She is not ill-appearing.   HENT:      Head: Normocephalic and atraumatic.   Eyes:      General: No scleral icterus.        Right eye: No " discharge.         Left eye: No discharge.      Conjunctiva/sclera: Conjunctivae normal.   Cardiovascular:      Rate and Rhythm: Normal rate and regular rhythm.      Pulses: Normal pulses.      Heart sounds: Normal heart sounds. No murmur heard.  Pulmonary:      Effort: Pulmonary effort is normal. No respiratory distress.      Breath sounds: Normal breath sounds.   Musculoskeletal:      Cervical back: Normal range of motion and neck supple. No rigidity.      Right lower leg: Edema (2+ mid shins b/l) present.      Left lower leg: Edema present.   Skin:     General: Skin is warm and dry.      Capillary Refill: Capillary refill takes less than 2 seconds.   Neurological:      Mental Status: She is alert and oriented to person, place, and time.      Gait: Gait abnormal (walker today).   Psychiatric:         Mood and Affect: Mood normal.         Behavior: Behavior normal.         Thought Content: Thought content normal.         Judgment: Judgment normal.

## 2025-01-25 ENCOUNTER — APPOINTMENT (EMERGENCY)
Dept: RADIOLOGY | Facility: HOSPITAL | Age: 85
End: 2025-01-25
Payer: MEDICARE

## 2025-01-25 ENCOUNTER — HOSPITAL ENCOUNTER (EMERGENCY)
Facility: HOSPITAL | Age: 85
Discharge: HOME | End: 2025-01-25
Attending: EMERGENCY MEDICINE | Admitting: EMERGENCY MEDICINE
Payer: MEDICARE

## 2025-01-25 VITALS
OXYGEN SATURATION: 93 % | DIASTOLIC BLOOD PRESSURE: 81 MMHG | RESPIRATION RATE: 18 BRPM | TEMPERATURE: 98.2 F | HEART RATE: 87 BPM | SYSTOLIC BLOOD PRESSURE: 181 MMHG

## 2025-01-25 DIAGNOSIS — N39.0 UTI (URINARY TRACT INFECTION): Primary | ICD-10-CM
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
## 2025-01-25 LAB
ALBUMIN SERPL BCG-MCNC: 4.2 G/DL (ref 3.5–5)
ALP SERPL-CCNC: 62 U/L (ref 34–104)
ALT SERPL W P-5'-P-CCNC: 17 U/L (ref 7–52)
AMORPH URATE CRY URNS QL MICRO: ABNORMAL
ANION GAP SERPL CALCULATED.3IONS-SCNC: 9 MMOL/L (ref 4–13)
AST SERPL W P-5'-P-CCNC: 23 U/L (ref 13–39)
BACTERIA UR QL AUTO: ABNORMAL /HPF
BASOPHILS # BLD AUTO: 0.02 THOUSANDS/ΜL (ref 0–0.1)
BASOPHILS NFR BLD AUTO: 0 % (ref 0–1)
BILIRUB SERPL-MCNC: 0.84 MG/DL (ref 0.2–1)
BILIRUB UR QL STRIP: NEGATIVE
BUN SERPL-MCNC: 18 MG/DL (ref 5–25)
CALCIUM SERPL-MCNC: 9.1 MG/DL (ref 8.4–10.2)
CAOX CRY URNS QL MICRO: ABNORMAL /HPF
CARDIAC TROPONIN I PNL SERPL HS: 7 NG/L (ref ?–50)
CHLORIDE SERPL-SCNC: 104 MMOL/L (ref 96–108)
CLARITY UR: ABNORMAL
CO2 SERPL-SCNC: 27 MMOL/L (ref 21–32)
COLOR UR: YELLOW
CREAT SERPL-MCNC: 0.89 MG/DL (ref 0.6–1.3)
EOSINOPHIL # BLD AUTO: 0.01 THOUSAND/ΜL (ref 0–0.61)
EOSINOPHIL NFR BLD AUTO: 0 % (ref 0–6)
ERYTHROCYTE [DISTWIDTH] IN BLOOD BY AUTOMATED COUNT: 13 % (ref 11.6–15.1)
FLUAV AG UPPER RESP QL IA.RAPID: NEGATIVE
FLUBV AG UPPER RESP QL IA.RAPID: NEGATIVE
GFR SERPL CREATININE-BSD FRML MDRD: 60 ML/MIN/1.73SQ M
GLUCOSE SERPL-MCNC: 135 MG/DL (ref 65–140)
GLUCOSE UR STRIP-MCNC: NEGATIVE MG/DL
GRAN CASTS #/AREA URNS LPF: ABNORMAL /[LPF]
HCT VFR BLD AUTO: 42.5 % (ref 34.8–46.1)
HGB BLD-MCNC: 13.7 G/DL (ref 11.5–15.4)
HGB UR QL STRIP.AUTO: ABNORMAL
IMM GRANULOCYTES # BLD AUTO: 0.04 THOUSAND/UL (ref 0–0.2)
IMM GRANULOCYTES NFR BLD AUTO: 1 % (ref 0–2)
KETONES UR STRIP-MCNC: NEGATIVE MG/DL
LEUKOCYTE ESTERASE UR QL STRIP: NEGATIVE
LYMPHOCYTES # BLD AUTO: 0.84 THOUSANDS/ΜL (ref 0.6–4.47)
LYMPHOCYTES NFR BLD AUTO: 11 % (ref 14–44)
MAGNESIUM SERPL-MCNC: 2 MG/DL (ref 1.9–2.7)
MCH RBC QN AUTO: 29.3 PG (ref 26.8–34.3)
MCHC RBC AUTO-ENTMCNC: 32.2 G/DL (ref 31.4–37.4)
MCV RBC AUTO: 91 FL (ref 82–98)
MONOCYTES # BLD AUTO: 0.94 THOUSAND/ΜL (ref 0.17–1.22)
MONOCYTES NFR BLD AUTO: 13 % (ref 4–12)
MUCOUS THREADS UR QL AUTO: ABNORMAL
NEUTROPHILS # BLD AUTO: 5.49 THOUSANDS/ΜL (ref 1.85–7.62)
NEUTS SEG NFR BLD AUTO: 75 % (ref 43–75)
NITRITE UR QL STRIP: NEGATIVE
NON-SQ EPI CELLS URNS QL MICRO: ABNORMAL /HPF
NRBC BLD AUTO-RTO: 0 /100 WBCS
PH UR STRIP.AUTO: 6 [PH]
PLATELET # BLD AUTO: 156 THOUSANDS/UL (ref 149–390)
PMV BLD AUTO: 12.2 FL (ref 8.9–12.7)
POTASSIUM SERPL-SCNC: 4.2 MMOL/L (ref 3.5–5.3)
PROT SERPL-MCNC: 7.1 G/DL (ref 6.4–8.4)
PROT UR STRIP-MCNC: ABNORMAL MG/DL
RBC # BLD AUTO: 4.67 MILLION/UL (ref 3.81–5.12)
RBC #/AREA URNS AUTO: ABNORMAL /HPF
SARS-COV+SARS-COV-2 AG RESP QL IA.RAPID: NEGATIVE
SODIUM SERPL-SCNC: 140 MMOL/L (ref 135–147)
SP GR UR STRIP.AUTO: 1.02 (ref 1–1.03)
TSH SERPL DL<=0.05 MIU/L-ACNC: 0.66 UIU/ML (ref 0.45–4.5)
UROBILINOGEN UR STRIP-ACNC: 2 MG/DL
WBC # BLD AUTO: 7.34 THOUSAND/UL (ref 4.31–10.16)
WBC #/AREA URNS AUTO: ABNORMAL /HPF

## 2025-01-25 PROCEDURE — 36415 COLL VENOUS BLD VENIPUNCTURE: CPT

## 2025-01-25 PROCEDURE — 81001 URINALYSIS AUTO W/SCOPE: CPT

## 2025-01-25 PROCEDURE — 99284 EMERGENCY DEPT VISIT MOD MDM: CPT

## 2025-01-25 PROCEDURE — 84443 ASSAY THYROID STIM HORMONE: CPT

## 2025-01-25 PROCEDURE — 87804 INFLUENZA ASSAY W/OPTIC: CPT

## 2025-01-25 PROCEDURE — 87086 URINE CULTURE/COLONY COUNT: CPT

## 2025-01-25 PROCEDURE — 87811 SARS-COV-2 COVID19 W/OPTIC: CPT

## 2025-01-25 PROCEDURE — 83735 ASSAY OF MAGNESIUM: CPT

## 2025-01-25 PROCEDURE — 85025 COMPLETE CBC W/AUTO DIFF WBC: CPT

## 2025-01-25 PROCEDURE — 84484 ASSAY OF TROPONIN QUANT: CPT

## 2025-01-25 PROCEDURE — 93005 ELECTROCARDIOGRAM TRACING: CPT

## 2025-01-25 PROCEDURE — 87077 CULTURE AEROBIC IDENTIFY: CPT

## 2025-01-25 PROCEDURE — 71046 X-RAY EXAM CHEST 2 VIEWS: CPT

## 2025-01-25 PROCEDURE — 87186 SC STD MICRODIL/AGAR DIL: CPT

## 2025-01-25 PROCEDURE — 80053 COMPREHEN METABOLIC PANEL: CPT

## 2025-01-25 RX ORDER — CEPHALEXIN 500 MG/1
500 CAPSULE ORAL 2 TIMES DAILY
Qty: 14 CAPSULE | Refills: 0 | Status: SHIPPED | OUTPATIENT
Start: 2025-01-25 | End: 2025-02-01

## 2025-01-25 RX ADMIN — CEPHALEXIN 500 MG: 250 CAPSULE ORAL at 17:20

## 2025-01-25 NOTE — DISCHARGE INSTRUCTIONS
Take the prescribed antibiotic as directed for the full duration of its course.  Increase your fluids to maintain your hydration.  Follow-up with primary care in 5 to 7 days for reevaluation.    Return to the ER if develop fever, difficulty breathing, persistent vomiting, severe abdominal pain, weakness, confusion, or lethargy.

## 2025-01-25 NOTE — ED PROVIDER NOTES
Time reflects when diagnosis was documented in both MDM as applicable and the Disposition within this note       Time User Action Codes Description Comment    1/25/2025  4:55 PM Danni Irvin Add [N39.0] UTI (urinary tract infection)     1/25/2025  4:55 PM Danni Irvin Add [W19.XXXA] Fall, initial encounter           ED Disposition       ED Disposition   Discharge    Condition   Stable    Date/Time   Sat Jan 25, 2025  4:55 PM    Comment   Johanna Alfonso discharge to home/self care.                   Assessment & Plan       Medical Decision Making  DDx including but not limited to: Alzheimer's dementia, considerable less likely dehydration, metabolic abnormality, anemia, viral illness, pneumonia, UTI    Patient with slide out of bed without traumatic finding on exam.  Currently offers no complaints and has no acute findings on head to toe exam.  She was able to walk with a walker without assistance.  No concern for traumatic injury at this time.  Of note, her  reports it was difficult to get her out of bed today and she had less strength than she typically would.  Due to 1 day of slight fatigue and generalized weakness, will obtain labs, UA, and chest x-ray to further evaluate for electrolyte derangement, organ dysfunction, SARITA, anemia, pneumonia, and UTI.    Patient continues to feel well, workup with finding of possible UTI.  Will treat with Keflex twice daily and send urine culture.  Patient at mental status baseline, I discussed results with her , he is in agreement plan for discharged home with close follow-up with primary care.  We discussed tricked return precautions and he demonstrates understanding.    Problems Addressed:  Fall, initial encounter: acute illness or injury  UTI (urinary tract infection): acute illness or injury    Amount and/or Complexity of Data Reviewed  Independent Historian: spouse  Labs: ordered. Decision-making details documented in ED Course.  Radiology: ordered and  independent interpretation performed.  ECG/medicine tests: ordered and independent interpretation performed.    Risk  OTC drugs.  Prescription drug management.        ED Course as of 01/25/25 1744   Sat Jan 25, 2025   1617 Patient continues to be comfortable, has no complaints, labs overall stable, workup pending UA   1636 Bacteria, UA(!): Innumerable  Moderate epithelial cells, may be contaminated, will send for urine culture, given patient is asymptomatic, without fevers or chills or leukocytosis, do suspect contamination and less so true UTI   1700 Patient's  informed of all the results, he is in agreement with plan for discharge given patient at ambulatory baseline.  She is not confused or altered.  Given urine taken by straight cath with the bacteria, we will plan for Keflex while awaiting urine culture       Medications   cephalexin (KEFLEX) capsule 500 mg (500 mg Oral Given 1/25/25 1720)       ED Risk Strat Scores   HEART Risk Score      Flowsheet Row Most Recent Value   Heart Score Risk Calculator    History 0 Filed at: 01/25/2025 1743   ECG 0 Filed at: 01/25/2025 1743   Age 2 Filed at: 01/25/2025 1743   Risk Factors 2 Filed at: 01/25/2025 1743   Troponin 0 Filed at: 01/25/2025 1743   HEART Score 4 Filed at: 01/25/2025 1743          HEART Risk Score      Flowsheet Row Most Recent Value   Heart Score Risk Calculator    History 0 Filed at: 01/25/2025 1743   ECG 0 Filed at: 01/25/2025 1743   Age 2 Filed at: 01/25/2025 1743   Risk Factors 2 Filed at: 01/25/2025 1743   Troponin 0 Filed at: 01/25/2025 1743   HEART Score 4 Filed at: 01/25/2025 1743                            SBIRT 22yo+      Flowsheet Row Most Recent Value   Initial Alcohol Screen: US AUDIT-C     1. How often do you have a drink containing alcohol? 0 Filed at: 01/25/2025 1432   2. How many drinks containing alcohol do you have on a typical day you are drinking?  0 Filed at: 01/25/2025 1432   3a. Male UNDER 65: How often do you have five or  more drinks on one occasion? 0 Filed at: 01/25/2025 1432   3b. FEMALE Any Age, or MALE 65+: How often do you have 4 or more drinks on one occassion? 0 Filed at: 01/25/2025 1432   Audit-C Score 0 Filed at: 01/25/2025 1432   STEPHANY: How many times in the past year have you...    Used an illegal drug or used a prescription medication for non-medical reasons? Never Filed at: 01/25/2025 1432                            History of Present Illness       Chief Complaint   Patient presents with    Fall     Slid out of bed onto butt.  Family was unable to get patient up on their own.  No head strike, no thinners.  History of alzheimers, currently at baseline mental status.        Past Medical History:   Diagnosis Date    Degenerative joint disease (DJD) of hip     Hyperlipidemia     Left knee pain 03/14/2021    Osteopenia     Osteoporosis       Past Surgical History:   Procedure Laterality Date    EYE SURGERY      HIP SURGERY      JOINT REPLACEMENT      right hip      Family History   Problem Relation Age of Onset    Breast cancer Mother     Substance Abuse Neg Hx     Mental illness Neg Hx       Social History     Tobacco Use    Smoking status: Never    Smokeless tobacco: Never   Vaping Use    Vaping status: Never Used   Substance Use Topics    Alcohol use: Never     Comment: Per [employer]:  Occasional alcohol    Drug use: No      E-Cigarette/Vaping    E-Cigarette Use Never User       E-Cigarette/Vaping Substances    Nicotine No     THC No     CBD No     Flavoring No     Other No     Unknown No       I have reviewed and agree with the history as documented.     The patient is an 84-year-old female with PMH of HLD and Alzheimer's dementia is brought in by EMS for fall out of bed.  The patient has a history of dementia and is oriented to self, place, and situation.  She is disoriented to time.  She currently offers no complaints.  She notes she was getting dressed with her  today when she slipped falling onto her buttocks.   She denies head strike and loss of consciousness.  She does not take aspirin or blood thinners.  EMS was called for a lift assist but her  wanted her to get checked out as he reports over the phone that she has been slightly more fatigued over the past day.  He denies her having any fevers, rhinorrhea, nasal congestion, complaint of sore throat, vomiting, or diarrhea.  He does note she has a slight dry cough.  The patient currently denies all complaints and reports feeling well.      History provided by:  Significant other  History limited by:  Dementia   used: No    Fall  Associated symptoms: no abdominal pain, no back pain, no chest pain, no headaches, no nausea and no vomiting        Review of Systems   Constitutional:  Positive for fatigue. Negative for chills and fever.   HENT:  Negative for congestion, sneezing, sore throat and trouble swallowing.    Respiratory:  Positive for cough. Negative for shortness of breath.    Cardiovascular:  Negative for chest pain.   Gastrointestinal:  Negative for abdominal pain, diarrhea, nausea and vomiting.   Musculoskeletal:  Negative for back pain and gait problem.   Skin:  Negative for rash and wound.   Allergic/Immunologic: Negative for immunocompromised state.   Neurological:  Negative for dizziness, syncope, weakness, light-headedness and headaches.   All other systems reviewed and are negative.          Objective       ED Triage Vitals   Temperature Pulse Blood Pressure Respirations SpO2 Patient Position - Orthostatic VS   01/25/25 1440 01/25/25 1431 01/25/25 1431 01/25/25 1431 01/25/25 1431 01/25/25 1431   98.2 °F (36.8 °C) 90 (!) 171/85 20 92 % Sitting      Temp Source Heart Rate Source BP Location FiO2 (%) Pain Score    01/25/25 1440 01/25/25 1720 01/25/25 1431 -- 01/25/25 1431    Oral Monitor Left arm  No Pain      Vitals      Date and Time Temp Pulse SpO2 Resp BP Pain Score FACES Pain Rating User   01/25/25 1720 -- 87 93 % 18 181/81 --  --    01/25/25 1600 -- 88 92 % 20 182/103 -- --    01/25/25 1442 -- -- -- -- -- No Pain --    01/25/25 1440 98.2 °F (36.8 °C) -- -- -- -- -- --    01/25/25 1431 -- 90 92 % 20 171/85 No Pain --             Physical Exam  Vitals and nursing note reviewed.   Constitutional:       General: She is not in acute distress.     Appearance: Normal appearance. She is not ill-appearing, toxic-appearing or diaphoretic.   HENT:      Head: Normocephalic and atraumatic. No contusion or laceration.      Jaw: There is normal jaw occlusion. No tenderness, swelling, pain on movement or malocclusion.      Right Ear: No hemotympanum.      Left Ear: No hemotympanum.      Nose: Nose normal. No nasal deformity or signs of injury.      Mouth/Throat:      Lips: Pink.      Mouth: Mucous membranes are moist.      Pharynx: Oropharynx is clear. Uvula midline.   Eyes:      General: Lids are normal. Vision grossly intact. Gaze aligned appropriately.      Extraocular Movements: Extraocular movements intact.      Right eye: No nystagmus.      Left eye: No nystagmus.      Pupils: Pupils are equal, round, and reactive to light.   Neck:      Trachea: Phonation normal. No abnormal tracheal secretions.   Cardiovascular:      Rate and Rhythm: Normal rate and regular rhythm.      Pulses:           Radial pulses are 2+ on the right side and 2+ on the left side.        Posterior tibial pulses are 2+ on the right side and 2+ on the left side.      Heart sounds: Normal heart sounds, S1 normal and S2 normal.   Pulmonary:      Effort: Pulmonary effort is normal. No tachypnea or respiratory distress.      Breath sounds: Normal breath sounds and air entry.   Abdominal:      Palpations: Abdomen is soft.      Tenderness: There is no abdominal tenderness.   Musculoskeletal:      Cervical back: Full passive range of motion without pain and neck supple. No spinous process tenderness.      Comments: CHARLES, 5/5 strength throughout, sensation intact, no focal joint  swelling or tenderness, ambulatory with steady gait.   Skin:     General: Skin is warm and dry.      Capillary Refill: Capillary refill takes less than 2 seconds.      Findings: No abrasion, bruising, ecchymosis, laceration, rash or wound.   Neurological:      General: No focal deficit present.      Mental Status: She is alert and oriented to person, place, and time. Mental status is at baseline.      Cranial Nerves: Cranial nerves 2-12 are intact.      Sensory: Sensation is intact.      Motor: Motor function is intact.      Gait: Gait is intact.   Psychiatric:         Behavior: Behavior is cooperative.         Results Reviewed       Procedure Component Value Units Date/Time    Urine culture [[id number]] Collected: 01/25/25 1724    Lab Status: In process Specimen: Urine, Clean Catch Updated: 01/25/25 1727    Urine Microscopic [[id number]]  (Abnormal) Collected: 01/25/25 1559    Lab Status: Final result Specimen: Urine, Straight Cath Updated: 01/25/25 1627     RBC, UA 2-4 /hpf      WBC, UA 2-4 /hpf      Epithelial Cells Moderate /hpf      Bacteria, UA Innumerable /hpf      MUCUS THREADS Moderate     Granular Casts, UA 0-3     Amorphous Crystals, UA Innumerable     Ca Oxalate Masha, UA Occasional /hpf     UA w Reflex to Microscopic w Reflex to Culture [[id number]]  (Abnormal) Collected: 01/25/25 1559    Lab Status: Final result Specimen: Urine, Straight Cath Updated: 01/25/25 1627     Color, UA Yellow     Clarity, UA Cloudy     Specific Gravity, UA 1.020     pH, UA 6.0     Leukocytes, UA Negative     Nitrite, UA Negative     Protein, UA 30 (1+) mg/dl      Glucose, UA Negative mg/dl      Ketones, UA Negative mg/dl      Urobilinogen, UA 2.0 mg/dl      Bilirubin, UA Negative     Occult Blood, UA Small    TSH, 3rd generation with Free T4 reflex [[id number]]  (Normal) Collected: 01/25/25 1508    Lab Status: Final result Specimen: Blood from Arm, Left Updated: 01/25/25 1544     TSH 3RD GENERATON 0.656 uIU/mL     FLU/COVID Rapid  Antigen (30 min. TAT) - Preferred screening test in ED [[id number]]  (Normal) Collected: 01/25/25 1508    Lab Status: Final result Specimen: Nares from Nose Updated: 01/25/25 1539     SARS COV Rapid Antigen Negative     Influenza A Rapid Antigen Negative     Influenza B Rapid Antigen Negative    Narrative:      This test has been performed using the Quidel Kirsten 2 FLU+SARS Antigen test under the Emergency Use Authorization (EUA). This test has been validated by the  and verified by the performing laboratory. The Kirsten uses lateral flow immunofluorescent sandwich assay to detect SARS-COV, Influenza A and Influenza B Antigen.     The Quidel Kirsten 2 SARS Antigen test does not differentiate between SARS-CoV and SARS-CoV-2.     Negative results are presumptive and may be confirmed with a molecular assay, if necessary, for patient management. Negative results do not rule out SARS-CoV-2 or influenza infection and should not be used as the sole basis for treatment or patient management decisions. A negative test result may occur if the level of antigen in a sample is below the limit of detection of this test.     Positive results are indicative of the presence of viral antigens, but do not rule out bacterial infection or co-infection with other viruses.     All test results should be used as an adjunct to clinical observations and other information available to the provider.    FOR PEDIATRIC PATIENTS - copy/paste COVID Guidelines URL to browser: [email or web address]    HS Troponin 0hr (reflex protocol) [[id number]]  (Normal) Collected: 01/25/25 1508    Lab Status: Final result Specimen: Blood from Arm, Left Updated: 01/25/25 1537     hs TnI 0hr 7 ng/L     Comprehensive metabolic panel [[id number]] Collected: 01/25/25 1508    Lab Status: Final result Specimen: Blood from Arm, Left Updated: 01/25/25 1529     Sodium 140 mmol/L      Potassium 4.2 mmol/L       Chloride 104 mmol/L      CO2 27 mmol/L      ANION GAP 9 mmol/L      BUN 18 mg/dL      Creatinine 0.89 mg/dL      Glucose 135 mg/dL      Calcium 9.1 mg/dL      AST 23 U/L      ALT 17 U/L      Alkaline Phosphatase 62 U/L      Total Protein 7.1 g/dL      Albumin 4.2 g/dL      Total Bilirubin 0.84 mg/dL      eGFR 60 ml/min/1.73sq m     Narrative:      National Kid[employer] guidelines for Chronic Kidney Disease (CKD):     Stage 1 with normal or high GFR (GFR > 90 mL/min/1.73 square meters)    Stage 2 Mild CKD (GFR = 60-89 mL/min/1.73 square meters)    Stage 3A Moderate CKD (GFR = 45-59 mL/min/1.73 square meters)    Stage 3B Moderate CKD (GFR = 30-44 mL/min/1.73 square meters)    Stage 4 Severe CKD (GFR = 15-29 mL/min/1.73 square meters)    Stage 5 End Stage CKD (GFR <15 mL/min/1.73 square meters)  Note: GFR calculation is accurate only with a steady state creatinine    Magnesium [[id number]]  (Normal) Collected: 01/25/25 1508    Lab Status: Final result Specimen: Blood from Arm, Left Updated: 01/25/25 1529     Magnesium 2.0 mg/dL     CBC and differential [[id number]]  (Abnormal) Collected: 01/25/25 1508    Lab Status: Final result Specimen: Blood from Arm, Left Updated: 01/25/25 1514     WBC 7.34 Thousand/uL      RBC 4.67 Million/uL      Hemoglobin 13.7 g/dL      Hematocrit 42.5 %      MCV 91 fL      MCH 29.3 pg      MCHC 32.2 g/dL      RDW 13.0 %      MPV 12.2 fL      Platelets 156 Thousands/uL      nRBC 0 /100 WBCs      Segmented % 75 %      Immature Grans % 1 %      Lymphocytes % 11 %      Monocytes % 13 %      Eosinophils Relative 0 %      Basophils Relative 0 %      Absolute Neutrophils 5.49 Thousands/µL      Absolute Immature Grans 0.04 Thousand/uL      Absolute Lymphocytes 0.84 Thousands/µL      Absolute Monocytes 0.94 Thousand/µL      Eosinophils Absolute 0.01 Thousand/µL      Basophils Absolute 0.02 Thousands/µL             XR chest 2 views   ED Interpretation by JAYCE Lane (01/25 1534)   No  acute cardiopulmonary disease identified by me.          ECG 12 Lead Documentation Only    Date/Time: 2025 2:50 PM    Performed by: JAYCE Lane  Authorized by: JAYCE Lane    Indications / Diagnosis:  Fatigue  ECG reviewed by me, the ED Provider: yes    Patient location:  ED  Previous ECG:     Previous ECG:  Compared to current    Comparison ECG info:  2021    Similarity:  No change    Comparison to cardiac monitor: Yes    Interpretation:     Interpretation: normal    Rate:     ECG rate:  89    ECG rate assessment: normal    Rhythm:     Rhythm: sinus rhythm    Ectopy:     Ectopy: none    QRS:     QRS axis:  Normal    QRS intervals:  Normal  Conduction:     Conduction: normal    ST segments:     ST segments:  Normal  T waves:     T waves: normal    Comments:      Sinus rhythm, normal axis, normal intervals, no acute ischemic changes read by me      ED Medication and Procedure Management   Prior to Admission Medications   Prescriptions Last Dose Informant Patient Reported? Taking?   Apoaequorin (Prevagen) 10 MG CAPS   Yes No   Sig: Take 1 tablet by mouth   Multiple Vitamins-Minerals (PRESERVISION AREDS) CAPS   Yes No   Sig: Take 2 capsules by mouth daily   OMEGA-3 FATTY ACIDS PO   Yes No   Sig: Take by mouth   amLODIPine (NORVASC) 10 mg tablet   No No   Sig: TAKE ONE TABLET BY MOUTH EVERY DAY   memantine (NAMENDA) 10 mg tablet   Yes No   Sig: Take 10 mg by mouth 2 (two) times a day   simvastatin (ZOCOR) 20 mg tablet   No No   Sig: Take 1 tablet (20 mg total) by mouth daily   sodium chloride (DEEJAY 128) 2 % hypertonic ophthalmic solution   Yes No   Si drop once    valsartan (DIOVAN) 320 MG tablet   No No   Sig: Take 1 tablet (320 mg total) by mouth daily      Facility-Administered Medications: None     Patient's Medications   Discharge Prescriptions    CEPHALEXIN (KEFLEX) 500 MG CAPSULE    Take 1 capsule (500 mg total) by mouth 2 (two) times a day for 7 days       Start Date:  1/25/2025 End Date: 2/1/2025       Order Dose: 500 mg       Quantity: 14 capsule    Refills: 0     No discharge procedures on file.  ED SEPSIS DOCUMENTATION   Time reflects when diagnosis was documented in both MDM as applicable and the Disposition within this note       Time User Action Codes Description Comment    1/25/2025  4:55 PM Danni Irvin [N39.0] UTI (urinary tract infection)     1/25/2025  4:55 PM Danni Irvin [W19.XXXA] Fall, initial encounter                  JAYCE Lane  01/25/25 0919

## 2025-01-26 LAB
ATRIAL RATE: 89 BPM
P AXIS: 64 DEGREES
PR INTERVAL: 176 MS
QRS AXIS: 22 DEGREES
QRSD INTERVAL: 70 MS
QT INTERVAL: 356 MS
QTC INTERVAL: 433 MS
T WAVE AXIS: 30 DEGREES
VENTRICULAR RATE: 89 BPM

## 2025-01-26 PROCEDURE — 99285 EMERGENCY DEPT VISIT HI MDM: CPT | Performed by: EMERGENCY MEDICINE

## 2025-01-26 PROCEDURE — 99284 EMERGENCY DEPT VISIT MOD MDM: CPT

## 2025-01-26 PROCEDURE — 93010 ELECTROCARDIOGRAM REPORT: CPT | Performed by: INTERNAL MEDICINE

## 2025-01-27 ENCOUNTER — HOSPITAL ENCOUNTER (EMERGENCY)
Facility: HOSPITAL | Age: 85
Discharge: HOME | End: 2025-01-27
Attending: EMERGENCY MEDICINE
Payer: MEDICARE

## 2025-01-27 ENCOUNTER — APPOINTMENT (EMERGENCY)
Dept: RADIOLOGY | Facility: HOSPITAL | Age: 85
End: 2025-01-27
Payer: MEDICARE

## 2025-01-27 ENCOUNTER — RESULTS FOLLOW-UP (OUTPATIENT)
Dept: EMERGENCY DEPT | Facility: HOSPITAL | Age: 85
End: 2025-01-27

## 2025-01-27 VITALS
BODY MASS INDEX: 36.09 KG/M2 | HEART RATE: 88 BPM | WEIGHT: 238.1 LBS | OXYGEN SATURATION: 95 % | DIASTOLIC BLOOD PRESSURE: 67 MMHG | RESPIRATION RATE: 20 BRPM | SYSTOLIC BLOOD PRESSURE: 151 MMHG | TEMPERATURE: 99.3 F | HEIGHT: 68 IN

## 2025-01-27 DIAGNOSIS — N39.0 UTI (URINARY TRACT INFECTION): Primary | ICD-10-CM

## 2025-01-27 DIAGNOSIS — G30.0 EARLY ONSET ALZHEIMER'S DEMENTIA WITHOUT BEHAVIORAL DISTURBANCE (HCC): ICD-10-CM

## 2025-01-27 DIAGNOSIS — F02.80 EARLY ONSET ALZHEIMER'S DEMENTIA WITHOUT BEHAVIORAL DISTURBANCE (HCC): ICD-10-CM

## 2025-01-27 LAB
ALBUMIN SERPL BCG-MCNC: 4.1 G/DL (ref 3.5–5)
ALP SERPL-CCNC: 58 U/L (ref 34–104)
ALT SERPL W P-5'-P-CCNC: 17 U/L (ref 7–52)
ANION GAP SERPL CALCULATED.3IONS-SCNC: 9 MMOL/L (ref 4–13)
AST SERPL W P-5'-P-CCNC: 28 U/L (ref 13–39)
BASOPHILS # BLD AUTO: 0.03 THOUSANDS/ΜL (ref 0–0.1)
BASOPHILS NFR BLD AUTO: 0 % (ref 0–1)
BILIRUB SERPL-MCNC: 0.56 MG/DL (ref 0.2–1)
BUN SERPL-MCNC: 22 MG/DL (ref 5–25)
CALCIUM SERPL-MCNC: 8.8 MG/DL (ref 8.4–10.2)
CHLORIDE SERPL-SCNC: 104 MMOL/L (ref 96–108)
CO2 SERPL-SCNC: 28 MMOL/L (ref 21–32)
CREAT SERPL-MCNC: 0.83 MG/DL (ref 0.6–1.3)
EOSINOPHIL # BLD AUTO: 0.1 THOUSAND/ΜL (ref 0–0.61)
EOSINOPHIL NFR BLD AUTO: 1 % (ref 0–6)
ERYTHROCYTE [DISTWIDTH] IN BLOOD BY AUTOMATED COUNT: 13.2 % (ref 11.6–15.1)
GFR SERPL CREATININE-BSD FRML MDRD: 65 ML/MIN/1.73SQ M
GLUCOSE SERPL-MCNC: 119 MG/DL (ref 65–140)
HCT VFR BLD AUTO: 42.3 % (ref 34.8–46.1)
HGB BLD-MCNC: 13.5 G/DL (ref 11.5–15.4)
IMM GRANULOCYTES # BLD AUTO: 0.02 THOUSAND/UL (ref 0–0.2)
IMM GRANULOCYTES NFR BLD AUTO: 0 % (ref 0–2)
LYMPHOCYTES # BLD AUTO: 1.17 THOUSANDS/ΜL (ref 0.6–4.47)
LYMPHOCYTES NFR BLD AUTO: 16 % (ref 14–44)
MCH RBC QN AUTO: 29.5 PG (ref 26.8–34.3)
MCHC RBC AUTO-ENTMCNC: 31.9 G/DL (ref 31.4–37.4)
MCV RBC AUTO: 92 FL (ref 82–98)
MONOCYTES # BLD AUTO: 0.94 THOUSAND/ΜL (ref 0.17–1.22)
MONOCYTES NFR BLD AUTO: 13 % (ref 4–12)
NEUTROPHILS # BLD AUTO: 5.04 THOUSANDS/ΜL (ref 1.85–7.62)
NEUTS SEG NFR BLD AUTO: 70 % (ref 43–75)
NRBC BLD AUTO-RTO: 0 /100 WBCS
PLATELET # BLD AUTO: 134 THOUSANDS/UL (ref 149–390)
PMV BLD AUTO: 12.3 FL (ref 8.9–12.7)
POTASSIUM SERPL-SCNC: 4.7 MMOL/L (ref 3.5–5.3)
PROT SERPL-MCNC: 7 G/DL (ref 6.4–8.4)
RBC # BLD AUTO: 4.58 MILLION/UL (ref 3.81–5.12)
SODIUM SERPL-SCNC: 141 MMOL/L (ref 135–147)
WBC # BLD AUTO: 7.3 THOUSAND/UL (ref 4.31–10.16)

## 2025-01-27 PROCEDURE — 36415 COLL VENOUS BLD VENIPUNCTURE: CPT | Performed by: EMERGENCY MEDICINE

## 2025-01-27 PROCEDURE — 85025 COMPLETE CBC W/AUTO DIFF WBC: CPT | Performed by: EMERGENCY MEDICINE

## 2025-01-27 PROCEDURE — 80053 COMPREHEN METABOLIC PANEL: CPT | Performed by: EMERGENCY MEDICINE

## 2025-01-27 PROCEDURE — 71045 X-RAY EXAM CHEST 1 VIEW: CPT

## 2025-01-27 RX ADMIN — CEPHALEXIN 500 MG: 250 CAPSULE ORAL at 02:03

## 2025-01-27 NOTE — ED PROVIDER NOTES
"Time reflects when diagnosis was documented in both MDM as applicable and the Disposition within this note       Time User Action Codes Description Comment    1/27/2025  1:56 AM Kory Weller [N39.0] UTI (urinary tract infection)     1/27/2025  1:56 AM Kory Weller Add [G30.0,  F02.80] 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)           ED Disposition       ED Disposition   Discharge    Condition   Stable    Date/Time   Mon Jan 27, 2025  1:44 AM    Comment   Johanna Alfonso discharge to home/self care.                   Assessment & Plan       Medical Decision Making    84 y.o. female presenting for weakness.  VSS, at baseline mental status.  History limited however patient denies any complaints.  Will obtain labs to evaluate for leukocytosis, anemia, electrolyte abnormality or SARITA.  Will obtain CXR as patient at risk for pneumonia.    Repeat evaluation: VSS. Patient ambulatory in ED with walker which is reportedly baseline.  No evidence of sepsis or SARITA.    Patient given written AVS with RTED precautions if  notices lethargy, decreased urine output, dehydration, fevers, vomiting or any other symptoms of concern. Advised patient followup with PCP for further evaluation.    Amount and/or Complexity of Data Reviewed  Labs: ordered.  Radiology: ordered and independent interpretation performed.    Risk  Prescription drug management.             Medications   cephalexin (KEFLEX) capsule 500 mg (500 mg Oral Given 1/27/25 0203)       ED Risk Strat Scores                                              History of Present Illness       Chief Complaint   Patient presents with    Cough     Pt via EMS from home with c/o  not picking up her UTI abx that she was here for yesterday and dx with; per EMS, pt now has cough and UTI symptoms are worse. Pt with hx of dementia but \"confusion is worse\" per EMS.       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     Hyperlipidemia     Left knee pain 03/14/2021    Osteopenia     Osteoporosis       Past Surgical History:   Procedure Laterality Date    EYE SURGERY      HIP SURGERY      JOINT REPLACEMENT      right hip      Family History   Problem Relation Age of Onset    Breast cancer Mother     Substance Abuse Neg Hx     Mental illness Neg Hx       Social History     Tobacco Use    Smoking status: Never    Smokeless tobacco: Never   Vaping Use    Vaping status: Never Used   Substance Use Topics    Alcohol use: Never     Comment: Per [employer]:  Occasional alcohol    Drug use: No      E-Cigarette/Vaping    E-Cigarette Use Never User       E-Cigarette/Vaping Substances    Nicotine No     THC No     CBD No     Flavoring No     Other No     Unknown No       I have reviewed and agree with the history as documented.     Johanna Alfonso is a 84 y.o. year old female with PMH of HLD, dementia presenting to the [facility] ED for weakness. Per  patient was weak today and had difficulty walking. Patient reportedly exhibiting cold-like symptoms. She has been less active than normal. Patient seen in ED one day ago and given Rx for antibiotics. When contacted via phone  states he was unaware of the Rx for antibiotics and did not review the discharge paperwork or fill the prescription for antibiotics. No reported fall or head trauma per . In ED patient denies any complaints.        History provided by:  Medical records, spouse and patient  History limited by:  Dementia   used: No    Cough      Review of Systems   Unable to perform ROS: Dementia           Objective       ED Triage Vitals [01/27/25 0011]   Temperature Pulse Blood Pressure Respirations SpO2 Patient Position - Orthostatic VS   99.3 °F (37.4 °C) 88 151/67 20 95 % --      Temp Source Heart Rate Source BP Location FiO2 (%) Pain Score    Oral Monitor Left arm -- No Pain      Vitals      Date and Time Temp Pulse SpO2 Resp BP Pain  Score FACES Pain Rating User   01/27/25 0011 99.3 °F (37.4 °C) 88 95 % 20 151/67 No Pain -- AD            Physical Exam  Vitals and nursing note reviewed.   Constitutional:       General: She is not in acute distress.     Appearance: Normal appearance. She is well-developed. She is not ill-appearing, toxic-appearing or diaphoretic.   HENT:      Head: Normocephalic and atraumatic.      Nose: No congestion or rhinorrhea.   Eyes:      General:         Right eye: No discharge.         Left eye: No discharge.   Cardiovascular:      Rate and Rhythm: Normal rate and regular rhythm.   Pulmonary:      Effort: Pulmonary effort is normal. No accessory muscle usage or respiratory distress.      Breath sounds: Normal breath sounds. No stridor. No decreased breath sounds, wheezing, rhonchi or rales.   Abdominal:      General: There is no distension.      Palpations: Abdomen is soft.      Tenderness: There is no abdominal tenderness. There is no guarding or rebound.   Musculoskeletal:      Cervical back: Normal range of motion and neck supple. No rigidity.      Right lower leg: No tenderness. No edema.      Left lower leg: No tenderness. No edema.   Skin:     Capillary Refill: Capillary refill takes less than 2 seconds.      Findings: No rash.   Neurological:      Mental Status: She is alert. Mental status is at baseline.      GCS: GCS eye subscore is 4. GCS verbal subscore is 4. GCS motor subscore is 6.   Psychiatric:         Mood and Affect: Mood normal.         Behavior: Behavior normal.         Results Reviewed       Procedure Component Value Units Date/Time    Comprehensive metabolic panel [[id number]] Collected: 01/27/25 0052    Lab Status: Final result Specimen: Blood from Arm, Right Updated: 01/27/25 0132     Sodium 141 mmol/L      Potassium 4.7 mmol/L      Chloride 104 mmol/L      CO2 28 mmol/L      ANION GAP 9 mmol/L      BUN 22 mg/dL      Creatinine 0.83 mg/dL      Glucose 119 mg/dL      Calcium 8.8 mg/dL      AST 28 U/L       ALT 17 U/L      Alkaline Phosphatase 58 U/L      Total Protein 7.0 g/dL      Albumin 4.1 g/dL      Total Bilirubin 0.56 mg/dL      eGFR 65 ml/min/1.73sq m     Narrative:      National Kidney Disease Foundation guidelines for Chronic Kidney Disease (CKD):     Stage 1 with normal or high GFR (GFR > 90 mL/min/1.73 square meters)    Stage 2 Mild CKD (GFR = 60-89 mL/min/1.73 square meters)    Stage 3A Moderate CKD (GFR = 45-59 mL/min/1.73 square meters)    Stage 3B Moderate CKD (GFR = 30-44 mL/min/1.73 square meters)    Stage 4 Severe CKD (GFR = 15-29 mL/min/1.73 square meters)    Stage 5 End Stage CKD (GFR <15 mL/min/1.73 square meters)  Note: GFR calculation is accurate only with a steady state creatinine    CBC and differential [[id number]]  (Abnormal) Collected: 01/27/25 0052    Lab Status: Final result Specimen: Blood from Arm, Right Updated: 01/27/25 0100     WBC 7.30 Thousand/uL      RBC 4.58 Million/uL      Hemoglobin 13.5 g/dL      Hematocrit 42.3 %      MCV 92 fL      MCH 29.5 pg      MCHC 31.9 g/dL      RDW 13.2 %      MPV 12.3 fL      Platelets 134 Thousands/uL      nRBC 0 /100 WBCs      Segmented % 70 %      Immature Grans % 0 %      Lymphocytes % 16 %      Monocytes % 13 %      Eosinophils Relative 1 %      Basophils Relative 0 %      Absolute Neutrophils 5.04 Thousands/µL      Absolute Immature Grans 0.02 Thousand/uL      Absolute Lymphocytes 1.17 Thousands/µL      Absolute Monocytes 0.94 Thousand/µL      Eosinophils Absolute 0.10 Thousand/µL      Basophils Absolute 0.03 Thousands/µL             XR chest portable   ED Interpretation by Kory Weller DO (01/27 0121)   No acute cardiopulmonary disease.          Procedures    ED Medication and Procedure Management   Prior to Admission Medications   Prescriptions Last Dose Informant Patient Reported? Taking?   Apoaequorin (Prevagen) 10 MG CAPS   Yes No   Sig: Take 1 tablet by mouth   Multiple Vitamins-Minerals (PRESERVISION AREDS) CAPS   Yes No   Sig:  Take 2 capsules by mouth daily   OMEGA-3 FATTY ACIDS PO   Yes No   Sig: Take by mouth   amLODIPine (NORVASC) 10 mg tablet   No No   Sig: TAKE ONE TABLET BY MOUTH EVERY DAY   cephalexin (KEFLEX) 500 mg capsule   No No   Sig: Take 1 capsule (500 mg total) by mouth 2 (two) times a day for 7 days   memantine (NAMENDA) 10 mg tablet   Yes No   Sig: Take 10 mg by mouth 2 (two) times a day   simvastatin (ZOCOR) 20 mg tablet   No No   Sig: Take 1 tablet (20 mg total) by mouth daily   sodium chloride (DEEJAY 128) 2 % hypertonic ophthalmic solution   Yes No   Si drop once    valsartan (DIOVAN) 320 MG tablet   No No   Sig: Take 1 tablet (320 mg total) by mouth daily      Facility-Administered Medications: None     Discharge Medication List as of 2025  1:57 AM        CONTINUE these medications which have NOT CHANGED    Details   amLODIPine (NORVASC) 10 mg tablet TAKE ONE TABLET BY MOUTH EVERY DAY, Normal      Apoaequorin (Prevagen) 10 MG CAPS Take 1 tablet by mouth, Starting Thu 2021, Historical Med      cephalexin (KEFLEX) 500 mg capsule Take 1 capsule (500 mg total) by mouth 2 (two) times a day for 7 days, Starting Sat 2025, Until Sat 2025, Normal      memantine (NAMENDA) 10 mg tablet Take 10 mg by mouth 2 (two) times a day, Starting Fri 3/11/2022, Historical Med      Multiple Vitamins-Minerals (PRESERVISION AREDS) CAPS Take 2 capsules by mouth daily, Starting 2015, Historical Med      OMEGA-3 FATTY ACIDS PO Take by mouth, Starting Thu 2021, Historical Med      simvastatin (ZOCOR) 20 mg tablet Take 1 tablet (20 mg total) by mouth daily, Starting 2024, Normal      sodium chloride (DEEJAY 128) 2 % hypertonic ophthalmic solution 1 drop once , Historical Med      valsartan (DIOVAN) 320 MG tablet Take 1 tablet (320 mg total) by mouth daily, Starting 2024, Normal           No discharge procedures on file.  ED SEPSIS DOCUMENTATION   Time reflects when diagnosis was documented in  both MDM as applicable and the Disposition within this note       Time User Action Codes Description Comment    1/27/2025  1:56 AM Kory Weller [N39.0] UTI (urinary tract infection)     1/27/2025  1:56 AM Kory Weller [G30.0,  F02.80] 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)                  Kory Weller,   01/27/25 0806

## 2025-01-27 NOTE — ED NOTES
Pt ambulated with steady gait with use of a walker; 2 Rns at standby if needed. Pt denied SOB, pain, and other difficulties while ambulating.     Yanni Reyna, ROSA  01/27/25 0154

## 2025-01-27 NOTE — DISCHARGE INSTRUCTIONS
Johanna Alfonso has been seen for weakness. Please complete the course of antibiotics as previously prescribed. Return to the emergency department if you notice lethargy, decreased urine output, dehydration, fevers, vomiting or any other symptoms of concern. Please follow up with their PCP by calling the number provided.

## 2025-01-27 NOTE — ED NOTES
Pt incontinent of urine and stool upon arrival to ED. Layla care performed and new brief applied to pt; no open wounds noted at this time, however, skin is red.     Yanni Reyna RN  01/27/25 0016

## 2025-01-28 LAB — BACTERIA UR CULT: ABNORMAL

## 2025-02-25 DIAGNOSIS — E78.00 HYPERCHOLESTEREMIA: ICD-10-CM

## 2025-02-25 DIAGNOSIS — I10 ESSENTIAL HYPERTENSION: ICD-10-CM

## 2025-02-26 RX ORDER — VALSARTAN 320 MG/1
320 TABLET ORAL DAILY
Qty: 90 TABLET | Refills: 0 | Status: SHIPPED | OUTPATIENT
Start: 2025-02-26

## 2025-02-26 RX ORDER — SIMVASTATIN 20 MG
20 TABLET ORAL DAILY
Qty: 90 TABLET | Refills: 0 | Status: SHIPPED | OUTPATIENT
Start: 2025-02-26

## 2025-03-04 DIAGNOSIS — I10 BENIGN ESSENTIAL HYPERTENSION: ICD-10-CM

## 2025-03-05 RX ORDER — AMLODIPINE BESYLATE 10 MG/1
10 TABLET ORAL DAILY
Qty: 90 TABLET | Refills: 1 | Status: SHIPPED | OUTPATIENT
Start: 2025-03-05

## 2025-03-27 ENCOUNTER — APPOINTMENT (EMERGENCY)
Dept: RADIOLOGY | Facility: HOSPITAL | Age: 85
End: 2025-03-27
Payer: MEDICARE

## 2025-03-27 ENCOUNTER — RESULTS FOLLOW-UP (OUTPATIENT)
Dept: EMERGENCY DEPT | Facility: HOSPITAL | Age: 85
End: 2025-03-27

## 2025-03-27 ENCOUNTER — APPOINTMENT (EMERGENCY)
Dept: CT IMAGING | Facility: HOSPITAL | Age: 85
End: 2025-03-27
Payer: MEDICARE

## 2025-03-27 ENCOUNTER — HOSPITAL ENCOUNTER (EMERGENCY)
Facility: HOSPITAL | Age: 85
Discharge: HOME | End: 2025-03-27
Attending: EMERGENCY MEDICINE
Payer: MEDICARE

## 2025-03-27 VITALS
DIASTOLIC BLOOD PRESSURE: 67 MMHG | RESPIRATION RATE: 18 BRPM | OXYGEN SATURATION: 93 % | TEMPERATURE: 99 F | SYSTOLIC BLOOD PRESSURE: 154 MMHG | HEART RATE: 88 BPM

## 2025-03-27 DIAGNOSIS — I10 BENIGN ESSENTIAL HYPERTENSION: ICD-10-CM

## 2025-03-27 DIAGNOSIS — F02.80 EARLY ONSET ALZHEIMER'S DEMENTIA WITHOUT BEHAVIORAL DISTURBANCE (HCC): ICD-10-CM

## 2025-03-27 DIAGNOSIS — G30.0 EARLY ONSET ALZHEIMER'S DEMENTIA WITHOUT BEHAVIORAL DISTURBANCE (HCC): ICD-10-CM

## 2025-03-27 DIAGNOSIS — R53.1 GENERALIZED WEAKNESS: Primary | ICD-10-CM

## 2025-03-27 LAB
ANION GAP SERPL CALCULATED.3IONS-SCNC: 7 MMOL/L (ref 4–13)
BACTERIA UR QL AUTO: NORMAL /HPF
BASOPHILS # BLD AUTO: 0.02 THOUSANDS/ÂΜL (ref 0–0.1)
BASOPHILS NFR BLD AUTO: 0 % (ref 0–1)
BILIRUB UR QL STRIP: NEGATIVE
BUN SERPL-MCNC: 17 MG/DL (ref 5–25)
CALCIUM SERPL-MCNC: 9 MG/DL (ref 8.4–10.2)
CHLORIDE SERPL-SCNC: 101 MMOL/L (ref 96–108)
CLARITY UR: CLEAR
CO2 SERPL-SCNC: 30 MMOL/L (ref 21–32)
COLOR UR: YELLOW
CREAT SERPL-MCNC: 0.78 MG/DL (ref 0.6–1.3)
EOSINOPHIL # BLD AUTO: 0.03 THOUSAND/ÂΜL (ref 0–0.61)
EOSINOPHIL NFR BLD AUTO: 0 % (ref 0–6)
ERYTHROCYTE [DISTWIDTH] IN BLOOD BY AUTOMATED COUNT: 13.3 % (ref 11.6–15.1)
GFR SERPL CREATININE-BSD FRML MDRD: 70 ML/MIN/1.73SQ M
GLUCOSE SERPL-MCNC: 144 MG/DL (ref 65–140)
GLUCOSE UR STRIP-MCNC: NEGATIVE MG/DL
HCT VFR BLD AUTO: 41.9 % (ref 34.8–46.1)
HGB BLD-MCNC: 13.3 G/DL (ref 11.5–15.4)
HGB UR QL STRIP.AUTO: NEGATIVE
IMM GRANULOCYTES # BLD AUTO: 0.06 THOUSAND/UL (ref 0–0.2)
IMM GRANULOCYTES NFR BLD AUTO: 0 % (ref 0–2)
KETONES UR STRIP-MCNC: NEGATIVE MG/DL
LEUKOCYTE ESTERASE UR QL STRIP: NEGATIVE
LYMPHOCYTES # BLD AUTO: 1.29 THOUSANDS/ÂΜL (ref 0.6–4.47)
LYMPHOCYTES NFR BLD AUTO: 10 % (ref 14–44)
MCH RBC QN AUTO: 28.9 PG (ref 26.8–34.3)
MCHC RBC AUTO-ENTMCNC: 31.7 G/DL (ref 31.4–37.4)
MCV RBC AUTO: 91 FL (ref 82–98)
MONOCYTES # BLD AUTO: 1.12 THOUSAND/ÂΜL (ref 0.17–1.22)
MONOCYTES NFR BLD AUTO: 8 % (ref 4–12)
NEUTROPHILS # BLD AUTO: 10.83 THOUSANDS/ÂΜL (ref 1.85–7.62)
NEUTS SEG NFR BLD AUTO: 82 % (ref 43–75)
NITRITE UR QL STRIP: NEGATIVE
NON-SQ EPI CELLS URNS QL MICRO: NORMAL /HPF
NRBC BLD AUTO-RTO: 0 /100 WBCS
PH UR STRIP.AUTO: 6 [PH]
PLATELET # BLD AUTO: 150 THOUSANDS/UL (ref 149–390)
PMV BLD AUTO: 12.4 FL (ref 8.9–12.7)
POTASSIUM SERPL-SCNC: 4.3 MMOL/L (ref 3.5–5.3)
PROT UR STRIP-MCNC: ABNORMAL MG/DL
RBC # BLD AUTO: 4.6 MILLION/UL (ref 3.81–5.12)
RBC #/AREA URNS AUTO: NORMAL /HPF
SODIUM SERPL-SCNC: 138 MMOL/L (ref 135–147)
SP GR UR STRIP.AUTO: 1.02 (ref 1–1.03)
UROBILINOGEN UR STRIP-ACNC: <2 MG/DL
WBC # BLD AUTO: 13.35 THOUSAND/UL (ref 4.31–10.16)
WBC #/AREA URNS AUTO: NORMAL /HPF

## 2025-03-27 PROCEDURE — 74177 CT ABD & PELVIS W/CONTRAST: CPT

## 2025-03-27 PROCEDURE — 36415 COLL VENOUS BLD VENIPUNCTURE: CPT | Performed by: EMERGENCY MEDICINE

## 2025-03-27 PROCEDURE — 80048 BASIC METABOLIC PNL TOTAL CA: CPT | Performed by: EMERGENCY MEDICINE

## 2025-03-27 PROCEDURE — 99285 EMERGENCY DEPT VISIT HI MDM: CPT | Performed by: EMERGENCY MEDICINE

## 2025-03-27 PROCEDURE — 81001 URINALYSIS AUTO W/SCOPE: CPT | Performed by: EMERGENCY MEDICINE

## 2025-03-27 PROCEDURE — 96360 HYDRATION IV INFUSION INIT: CPT

## 2025-03-27 PROCEDURE — 71045 X-RAY EXAM CHEST 1 VIEW: CPT

## 2025-03-27 PROCEDURE — 85025 COMPLETE CBC W/AUTO DIFF WBC: CPT | Performed by: EMERGENCY MEDICINE

## 2025-03-27 PROCEDURE — 99284 EMERGENCY DEPT VISIT MOD MDM: CPT

## 2025-03-27 RX ORDER — ACETAMINOPHEN 325 MG/1
975 TABLET ORAL ONCE
Status: COMPLETED | OUTPATIENT
Start: 2025-03-27 | End: 2025-03-27

## 2025-03-27 RX ADMIN — IOHEXOL 100 ML: 350 INJECTION, SOLUTION INTRAVENOUS at 05:20

## 2025-03-27 RX ADMIN — SODIUM CHLORIDE 500 ML: 0.9 INJECTION, SOLUTION INTRAVENOUS at 05:03

## 2025-03-27 RX ADMIN — ACETAMINOPHEN 975 MG: 325 TABLET, FILM COATED ORAL at 04:58

## 2025-03-27 NOTE — DISCHARGE INSTRUCTIONS
Johanna Alfonso has been seen for weakness. Please continue her home medication regimen. Return to the emergency department if you notice lethargy, decreased urine output, dehydration, fevers, vomiting or any other symptoms of concern. Please follow up with their PCP by calling the number provided.

## 2025-03-27 NOTE — ED PROVIDER NOTES
Time reflects when diagnosis was documented in both MDM as applicable and the Disposition within this note       Time User Action Codes Description Comment    3/27/2025  5:56 AM Kory Weller [R53.1] Generalized weakness     3/27/2025  5:56 AM Kory Weller [G30.0,  F02.80] 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)     3/27/2025  5:56 AM Kory Weller [I10] Benign essential hypertension           ED Disposition       ED Disposition   Discharge    Condition   Stable    Date/Time   u Mar 27, 2025  5:57 AM    Comment   Johanna Alfonso discharge to home/self care.                   Assessment & Plan       Medical Decision Making    85 y.o. female presenting for generalized weakness.  VSS, without complaint at time of evaluation.  Will obtain labs to evaluate for sepsis, anemia, electrolyte abnormality or SARITA.  Will obtain CXR as patient is at risk for pneumonia.  Will obtain UA as patient is at risk for UTI.  Will obtain CT to evaluate for kidney stone, spinal lesions or other etiology of back pain.    Repeat evaluation: Vital stable during ED course.  Patient ambulatory in the emergency department with use of a walker which is her baseline per .  Labs and imaging results reviewed with the  in detail.  No evidence of pneumonia, UTI or other etiology of symptoms.  As such and as patient is ambulatory at baseline  is comfortable taking the patient home.  Advised PCP follow-up should symptoms worsen or fail to improve.     given written AVS with RTED precautions if staff notices lethargy, weakness, fevers or any other symptoms of concern. The patient was also provided written a written after visit summary (AVS) with return precautions. I have also provided contact information for patient to follow up with their PCP.     Amount and/or Complexity of Data Reviewed  Labs: ordered.  Radiology: ordered and independent interpretation  performed.    Risk  OTC drugs.  Prescription drug management.        ED Course as of 03/27/25 0726   Thu Mar 27, 2025   0552 Patient ambulatory in the emergency department using walker without staff assistance. Reviewed care with , this appears to be baseline ambulatory status.  agreeable to discharge and PCP f/u.       Medications   sodium chloride 0.9 % bolus 500 mL (0 mL Intravenous Stopped 3/27/25 0603)   acetaminophen (TYLENOL) tablet 975 mg (975 mg Oral Given 3/27/25 0458)   iohexol (OMNIPAQUE) 350 MG/ML injection (MULTI-DOSE) 100 mL (100 mL Intravenous Given 3/27/25 0520)       ED Risk Strat Scores                                                History of Present Illness       Chief Complaint   Patient presents with    Possible UTI     Pt snet here by  for possibkle uti       Past Medical History:   Diagnosis Date    Degenerative joint disease (DJD) of hip     Hyperlipidemia     Left knee pain 03/14/2021    Osteopenia     Osteoporosis       Past Surgical History:   Procedure Laterality Date    EYE SURGERY      HIP SURGERY      JOINT REPLACEMENT      right hip      Family History   Problem Relation Age of Onset    Breast cancer Mother     Substance Abuse Neg Hx     Mental illness Neg Hx       Social History     Tobacco Use    Smoking status: Never    Smokeless tobacco: Never   Vaping Use    Vaping status: Never Used   Substance Use Topics    Alcohol use: Never     Comment: Per Allscripts:  Occasional alcohol    Drug use: No      E-Cigarette/Vaping    E-Cigarette Use Never User       E-Cigarette/Vaping Substances    Nicotine No     THC No     CBD No     Flavoring No     Other No     Unknown No       I have reviewed and agree with the history as documented.     Johanna Alfonso is a 85 y.o. year old female with PMH of HTN, hyperlipidemia, dementia presenting to the [facility] ED for generalized weakness. Patient sent to ED by . Per  patient wouldn't eat and wouldn't take her  medications this evening. She went out to lunch with her  yesterday without issue. This evening she wouldn't get up to eat citing low back discomfort. No reported fall or head trauma. No fevers per . No vomiting or diarrhea. History from patient limited due to dementia however she denies any complaints.      History provided by:  Patient and spouse  History limited by:  Dementia   used: No        Review of Systems   Unable to perform ROS: Dementia           Objective       ED Triage Vitals   Temperature Pulse Blood Pressure Respirations SpO2 Patient Position - Orthostatic VS   03/27/25 0315 03/27/25 0315 03/27/25 0315 03/27/25 0315 03/27/25 0315 03/27/25 0315   99.1 °F (37.3 °C) 85 (!) 192/75 18 94 % Lying      Temp Source Heart Rate Source BP Location FiO2 (%) Pain Score    03/27/25 0434 -- 03/27/25 0315 -- 03/27/25 0458    Rectal  Right arm  6      Vitals      Date and Time Temp Pulse SpO2 Resp BP Pain Score FACES Pain Rating User   03/27/25 0530 -- 88 93 % 18 154/67 -- -- MG   03/27/25 0458 -- -- -- -- -- 6 -- MG   03/27/25 0434 99 °F (37.2 °C) -- -- -- -- -- -- MG   03/27/25 0430 -- 90 92 % 18 185/77 -- -- MG   03/27/25 0400 -- 85 92 % 18 189/85 -- -- MG   03/27/25 0315 99.1 °F (37.3 °C) 85 94 % 18 192/75 -- -- MG            Physical Exam  Vitals and nursing note reviewed.   Constitutional:       General: She is not in acute distress.     Appearance: Normal appearance. She is well-developed. She is not ill-appearing, toxic-appearing or diaphoretic.   HENT:      Head: Normocephalic and atraumatic.      Nose: No congestion or rhinorrhea.   Eyes:      General:         Right eye: No discharge.         Left eye: No discharge.   Cardiovascular:      Rate and Rhythm: Normal rate and regular rhythm.   Pulmonary:      Effort: Pulmonary effort is normal. No accessory muscle usage or respiratory distress.      Breath sounds: Normal breath sounds. No stridor. No decreased breath sounds,  wheezing, rhonchi or rales.   Abdominal:      General: There is no distension.      Palpations: Abdomen is soft.      Tenderness: There is no abdominal tenderness. There is no right CVA tenderness, left CVA tenderness, guarding or rebound.   Musculoskeletal:      Cervical back: Normal range of motion and neck supple. No rigidity.      Thoracic back: No bony tenderness.      Lumbar back: Tenderness present. No bony tenderness.      Right lower leg: No tenderness.      Left lower leg: No tenderness.   Skin:     Capillary Refill: Capillary refill takes less than 2 seconds.      Coloration: Skin is not pale.   Neurological:      Mental Status: She is alert. Mental status is at baseline.      GCS: GCS eye subscore is 4. GCS verbal subscore is 4. GCS motor subscore is 6.      Cranial Nerves: No dysarthria or facial asymmetry.      Sensory: Sensation is intact.      Motor: Motor function is intact. No weakness.      Comments: 5/5 strength b/l UE/LE.  Sensation grossly intact throughout.     Psychiatric:         Mood and Affect: Mood normal.         Behavior: Behavior normal.         Results Reviewed       Procedure Component Value Units Date/Time    Urine Microscopic [[id number]]  (Normal) Collected: 03/27/25 0434    Lab Status: Final result Specimen: Urine, Straight Cath Updated: 03/27/25 0452     RBC, UA None Seen /hpf      WBC, UA 0-1 /hpf      Epithelial Cells Occasional /hpf      Bacteria, UA Occasional /hpf     UA w Reflex to Microscopic w Reflex to Culture [[id number]]  (Abnormal) Collected: 03/27/25 0434    Lab Status: Final result Specimen: Urine, Straight Cath Updated: 03/27/25 0445     Color, UA Yellow     Clarity, UA Clear     Specific Gravity, UA 1.020     pH, UA 6.0     Leukocytes, UA Negative     Nitrite, UA Negative     Protein, UA Trace mg/dl      Glucose, UA Negative mg/dl      Ketones, UA Negative mg/dl      Urobilinogen, UA <2.0 mg/dl      Bilirubin, UA Negative     Occult Blood, UA Negative    Basic  metabolic panel [[id number]]  (Abnormal) Collected: 03/27/25 0331    Lab Status: Final result Specimen: Blood from Arm, Right Updated: 03/27/25 0406     Sodium 138 mmol/L      Potassium 4.3 mmol/L      Chloride 101 mmol/L      CO2 30 mmol/L      ANION GAP 7 mmol/L      BUN 17 mg/dL      Creatinine 0.78 mg/dL      Glucose 144 mg/dL      Calcium 9.0 mg/dL      eGFR 70 ml/min/1.73sq m     Narrative:      National Kidney Disease Foundation guidelines for Chronic Kidney Disease (CKD):     Stage 1 with normal or high GFR (GFR > 90 mL/min/1.73 square meters)    Stage 2 Mild CKD (GFR = 60-89 mL/min/1.73 square meters)    Stage 3A Moderate CKD (GFR = 45-59 mL/min/1.73 square meters)    Stage 3B Moderate CKD (GFR = 30-44 mL/min/1.73 square meters)    Stage 4 Severe CKD (GFR = 15-29 mL/min/1.73 square meters)    Stage 5 End Stage CKD (GFR <15 mL/min/1.73 square meters)  Note: GFR calculation is accurate only with a steady state creatinine    CBC and differential [[id number]]  (Abnormal) Collected: 03/27/25 0331    Lab Status: Final result Specimen: Blood from Arm, Right Updated: 03/27/25 0352     WBC 13.35 Thousand/uL      RBC 4.60 Million/uL      Hemoglobin 13.3 g/dL      Hematocrit 41.9 %      MCV 91 fL      MCH 28.9 pg      MCHC 31.7 g/dL      RDW 13.3 %      MPV 12.4 fL      Platelets 150 Thousands/uL      nRBC 0 /100 WBCs      Segmented % 82 %      Immature Grans % 0 %      Lymphocytes % 10 %      Monocytes % 8 %      Eosinophils Relative 0 %      Basophils Relative 0 %      Absolute Neutrophils 10.83 Thousands/µL      Absolute Immature Grans 0.06 Thousand/uL      Absolute Lymphocytes 1.29 Thousands/µL      Absolute Monocytes 1.12 Thousand/µL      Eosinophils Absolute 0.03 Thousand/µL      Basophils Absolute 0.02 Thousands/µL             XR chest portable   ED Interpretation by Kory Weller DO (03/27 0525)   No focal infiltrate.  No pneumothorax.  No acute cardiopulmonary disease.      CT abdomen pelvis with contrast    Final Interpretation by Yanick Herbert MD ([id number])      No acute intra-abdominal pathology.   Uncomplicated colonic diverticulosis.   7 mm thickened endometrium, above normal limits for postmenopausal state. The differential diagnosis includes but is not limited to endometrial hyperplasia, polyp, versus neoplasm. This is improved from  where the endometrium measured up to 13 mm in    maximal thickness. Consider correlation with nonemergent dedicated pelvic ultrasound in the outpatient setting.         Workstation performed: [id number]             Procedures    ED Medication and Procedure Management   Prior to Admission Medications   Prescriptions Last Dose Informant Patient Reported? Taking?   Apoaequorin (Prevagen) 10 MG CAPS   Yes No   Sig: Take 1 tablet by mouth   Multiple Vitamins-Minerals (PRESERVISION AREDS) CAPS   Yes No   Sig: Take 2 capsules by mouth daily   OMEGA-3 FATTY ACIDS PO   Yes No   Sig: Take by mouth   amLODIPine (NORVASC) 10 mg tablet   No No   Sig: TAKE ONE TABLET BY MOUTH EVERY DAY   memantine (NAMENDA) 10 mg tablet   Yes No   Sig: Take 10 mg by mouth 2 (two) times a day   simvastatin (ZOCOR) 20 mg tablet   No No   Sig: Take 1 tablet (20 mg total) by mouth daily   sodium chloride (DEEJAY 128) 2 % hypertonic ophthalmic solution   Yes No   Si drop once    valsartan (DIOVAN) 320 MG tablet   No No   Sig: Take 1 tablet (320 mg total) by mouth daily      Facility-Administered Medications: None     Discharge Medication List as of 3/27/2025  5:57 AM        CONTINUE these medications which have NOT CHANGED    Details   amLODIPine (NORVASC) 10 mg tablet TAKE ONE TABLET BY MOUTH EVERY DAY, Starting Wed 3/5/2025, Normal      Apoaequorin (Prevagen) 10 MG CAPS Take 1 tablet by mouth, Starting Thu 2021, Historical Med      memantine (NAMENDA) 10 mg tablet Take 10 mg by mouth 2 (two) times a day, Starting Fri 3/11/2022, Historical Med      Multiple Vitamins-Minerals (PRESERVISION AREDS) CAPS  Take 2 capsules by mouth daily, Starting Tue 5/12/2015, Historical Med      OMEGA-3 FATTY ACIDS PO Take by mouth, Starting Thu 1/14/2021, Historical Med      simvastatin (ZOCOR) 20 mg tablet Take 1 tablet (20 mg total) by mouth daily, Starting Wed 2/26/2025, Normal      sodium chloride (DEEJAY 128) 2 % hypertonic ophthalmic solution 1 drop once , Historical Med      valsartan (DIOVAN) 320 MG tablet Take 1 tablet (320 mg total) by mouth daily, Starting Wed 2/26/2025, Normal           No discharge procedures on file.  ED SEPSIS DOCUMENTATION   Time reflects when diagnosis was documented in both MDM as applicable and the Disposition within this note       Time User Action Codes Description Comment    3/27/2025  5:56 AM Kory Weller [R53.1] Generalized weakness     3/27/2025  5:56 AM Kory Weller [G30.0,  F02.80] 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)     3/27/2025  5:56 AM Kory Weller [I10] Benign essential hypertension                  Kory Weller, DO  03/27/25 0726

## 2025-04-25 ENCOUNTER — TELEPHONE (OUTPATIENT)
Age: 85
End: 2025-04-25

## 2025-04-25 NOTE — TELEPHONE ENCOUNTER
Pt will be DC from [facility] and [facility] received a referral for skilled nursing.  was unsure of DC date. She will be faxing order info to office. Confirmed fax #.

## 2025-05-23 ENCOUNTER — TELEPHONE (OUTPATIENT)
Dept: FAMILY MEDICINE CLINIC | Facility: HOSPITAL | Age: 85
End: 2025-05-23

## 2025-05-23 NOTE — TELEPHONE ENCOUNTER
05/23/25 - Spoke to , he was driving and he will call the office once he is home to R/S 06/10/25 apppt- DS

## 2025-05-27 DIAGNOSIS — E78.00 HYPERCHOLESTEREMIA: ICD-10-CM

## 2025-05-27 DIAGNOSIS — I10 ESSENTIAL HYPERTENSION: ICD-10-CM

## 2025-05-28 RX ORDER — VALSARTAN 320 MG/1
320 TABLET ORAL DAILY
Qty: 90 TABLET | Refills: 1 | Status: SHIPPED | OUTPATIENT
Start: 2025-05-28

## 2025-05-28 RX ORDER — SIMVASTATIN 20 MG
20 TABLET ORAL DAILY
Qty: 30 TABLET | Refills: 0 | Status: SHIPPED | OUTPATIENT
Start: 2025-05-28

## 2025-06-03 ENCOUNTER — TELEPHONE (OUTPATIENT)
Age: 85
End: 2025-06-03

## 2025-06-03 NOTE — TELEPHONE ENCOUNTER
Erica from [facility] Saw [facility] today  d reported fall a couple of days ago and called EMS, Had no injury. They are working on safety and walking. They will have 2 more visits with Johanna.

## 2025-06-24 DIAGNOSIS — E78.00 HYPERCHOLESTEREMIA: ICD-10-CM

## 2025-06-25 RX ORDER — SIMVASTATIN 20 MG
20 TABLET ORAL DAILY
Qty: 100 TABLET | Refills: 1 | Status: SHIPPED | OUTPATIENT
Start: 2025-06-25

## 2025-07-05 DIAGNOSIS — I10 BENIGN ESSENTIAL HYPERTENSION: ICD-10-CM

## 2025-07-07 RX ORDER — AMLODIPINE BESYLATE 10 MG/1
10 TABLET ORAL DAILY
Qty: 90 TABLET | Refills: 1 | Status: SHIPPED | OUTPATIENT
Start: 2025-07-07

## 2025-08-12 ENCOUNTER — OFFICE VISIT (OUTPATIENT)
Dept: FAMILY MEDICINE CLINIC | Facility: HOSPITAL | Age: 85
End: 2025-08-12
Payer: MEDICARE

## 2025-08-12 ENCOUNTER — APPOINTMENT (OUTPATIENT)
Dept: LAB | Facility: HOSPITAL | Age: 85
End: 2025-08-12
Payer: MEDICARE

## 2025-08-12 PROBLEM — E53.8 B12 DEFICIENCY: Status: ACTIVE | Noted: 2025-08-12
